# Patient Record
Sex: FEMALE | Race: WHITE | NOT HISPANIC OR LATINO | Employment: OTHER | URBAN - METROPOLITAN AREA
[De-identification: names, ages, dates, MRNs, and addresses within clinical notes are randomized per-mention and may not be internally consistent; named-entity substitution may affect disease eponyms.]

---

## 2020-08-19 ENCOUNTER — TRANSCRIBE ORDERS (OUTPATIENT)
Dept: ADMINISTRATIVE | Facility: HOSPITAL | Age: 65
End: 2020-08-19

## 2020-08-19 DIAGNOSIS — M25.572 LEFT ANKLE PAIN, UNSPECIFIED CHRONICITY: Primary | ICD-10-CM

## 2020-08-25 ENCOUNTER — HOSPITAL ENCOUNTER (OUTPATIENT)
Dept: CT IMAGING | Facility: HOSPITAL | Age: 65
Discharge: HOME | End: 2020-08-25
Admitting: ORTHOPAEDIC SURGERY

## 2020-08-25 DIAGNOSIS — M25.572 LEFT ANKLE PAIN, UNSPECIFIED CHRONICITY: ICD-10-CM

## 2020-08-25 PROCEDURE — 73700 CT LOWER EXTREMITY W/O DYE: CPT

## 2020-09-30 ENCOUNTER — APPOINTMENT (OUTPATIENT)
Dept: PREADMISSION TESTING | Facility: HOSPITAL | Age: 65
End: 2020-09-30

## 2020-09-30 VITALS
SYSTOLIC BLOOD PRESSURE: 205 MMHG | DIASTOLIC BLOOD PRESSURE: 97 MMHG | BODY MASS INDEX: 26.63 KG/M2 | RESPIRATION RATE: 18 BRPM | WEIGHT: 186 LBS | HEART RATE: 91 BPM | TEMPERATURE: 97.7 F | OXYGEN SATURATION: 99 % | HEIGHT: 70 IN

## 2020-09-30 LAB
ALBUMIN SERPL-MCNC: 4.2 G/DL (ref 3.5–5.2)
ALBUMIN/GLOB SERPL: 1.2 G/DL
ALP SERPL-CCNC: 86 U/L (ref 39–117)
ALT SERPL W P-5'-P-CCNC: 17 U/L (ref 1–33)
ANION GAP SERPL CALCULATED.3IONS-SCNC: 12 MMOL/L (ref 5–15)
AST SERPL-CCNC: 16 U/L (ref 1–32)
BASOPHILS # BLD AUTO: 0.06 10*3/MM3 (ref 0–0.2)
BASOPHILS NFR BLD AUTO: 0.9 % (ref 0–1.5)
BILIRUB SERPL-MCNC: 0.5 MG/DL (ref 0–1.2)
BUN SERPL-MCNC: 18 MG/DL (ref 8–23)
BUN/CREAT SERPL: 23.1 (ref 7–25)
CALCIUM SPEC-SCNC: 10 MG/DL (ref 8.6–10.5)
CHLORIDE SERPL-SCNC: 106 MMOL/L (ref 98–107)
CO2 SERPL-SCNC: 23 MMOL/L (ref 22–29)
CREAT SERPL-MCNC: 0.78 MG/DL (ref 0.57–1)
DEPRECATED RDW RBC AUTO: 39.2 FL (ref 37–54)
EOSINOPHIL # BLD AUTO: 0.09 10*3/MM3 (ref 0–0.4)
EOSINOPHIL NFR BLD AUTO: 1.3 % (ref 0.3–6.2)
ERYTHROCYTE [DISTWIDTH] IN BLOOD BY AUTOMATED COUNT: 13.6 % (ref 12.3–15.4)
GFR SERPL CREATININE-BSD FRML MDRD: 74 ML/MIN/1.73
GLOBULIN UR ELPH-MCNC: 3.4 GM/DL
GLUCOSE SERPL-MCNC: 109 MG/DL (ref 65–99)
HCT VFR BLD AUTO: 43.6 % (ref 34–46.6)
HGB BLD-MCNC: 15.1 G/DL (ref 12–15.9)
IMM GRANULOCYTES # BLD AUTO: 0.03 10*3/MM3 (ref 0–0.05)
IMM GRANULOCYTES NFR BLD AUTO: 0.4 % (ref 0–0.5)
LYMPHOCYTES # BLD AUTO: 1.83 10*3/MM3 (ref 0.7–3.1)
LYMPHOCYTES NFR BLD AUTO: 26.7 % (ref 19.6–45.3)
MCH RBC QN AUTO: 28.2 PG (ref 26.6–33)
MCHC RBC AUTO-ENTMCNC: 34.6 G/DL (ref 31.5–35.7)
MCV RBC AUTO: 81.5 FL (ref 79–97)
MONOCYTES # BLD AUTO: 0.49 10*3/MM3 (ref 0.1–0.9)
MONOCYTES NFR BLD AUTO: 7.2 % (ref 5–12)
NEUTROPHILS NFR BLD AUTO: 4.35 10*3/MM3 (ref 1.7–7)
NEUTROPHILS NFR BLD AUTO: 63.5 % (ref 42.7–76)
NRBC BLD AUTO-RTO: 0 /100 WBC (ref 0–0.2)
PLATELET # BLD AUTO: 249 10*3/MM3 (ref 140–450)
PMV BLD AUTO: 9.9 FL (ref 6–12)
POTASSIUM SERPL-SCNC: 4.1 MMOL/L (ref 3.5–5.2)
PROT SERPL-MCNC: 7.6 G/DL (ref 6–8.5)
RBC # BLD AUTO: 5.35 10*6/MM3 (ref 3.77–5.28)
SODIUM SERPL-SCNC: 141 MMOL/L (ref 136–145)
WBC # BLD AUTO: 6.85 10*3/MM3 (ref 3.4–10.8)

## 2020-09-30 PROCEDURE — C9803 HOPD COVID-19 SPEC COLLECT: HCPCS

## 2020-09-30 PROCEDURE — 85025 COMPLETE CBC W/AUTO DIFF WBC: CPT | Performed by: ORTHOPAEDIC SURGERY

## 2020-09-30 PROCEDURE — 80053 COMPREHEN METABOLIC PANEL: CPT | Performed by: ORTHOPAEDIC SURGERY

## 2020-09-30 PROCEDURE — 93010 ELECTROCARDIOGRAM REPORT: CPT | Performed by: INTERNAL MEDICINE

## 2020-09-30 PROCEDURE — 36415 COLL VENOUS BLD VENIPUNCTURE: CPT

## 2020-09-30 PROCEDURE — U0004 COV-19 TEST NON-CDC HGH THRU: HCPCS | Performed by: ORTHOPAEDIC SURGERY

## 2020-09-30 PROCEDURE — 93005 ELECTROCARDIOGRAM TRACING: CPT

## 2020-10-01 ENCOUNTER — PREP FOR SURGERY (OUTPATIENT)
Dept: OTHER | Facility: HOSPITAL | Age: 65
End: 2020-10-01

## 2020-10-01 DIAGNOSIS — Z98.890 S/P FOOT SURGERY: Primary | ICD-10-CM

## 2020-10-01 LAB — SARS-COV-2 RNA RESP QL NAA+PROBE: DETECTED

## 2020-10-01 RX ORDER — CEFAZOLIN SODIUM 2 G/100ML
2 INJECTION, SOLUTION INTRAVENOUS ONCE
Status: CANCELLED | OUTPATIENT
Start: 2020-10-02 | End: 2020-10-01

## 2020-11-27 ENCOUNTER — TRANSCRIBE ORDERS (OUTPATIENT)
Dept: PREADMISSION TESTING | Facility: HOSPITAL | Age: 65
End: 2020-11-27

## 2020-11-27 DIAGNOSIS — Z01.818 OTHER SPECIFIED PRE-OPERATIVE EXAMINATION: Primary | ICD-10-CM

## 2020-12-01 ENCOUNTER — APPOINTMENT (OUTPATIENT)
Dept: PREADMISSION TESTING | Facility: HOSPITAL | Age: 65
End: 2020-12-01

## 2020-12-01 VITALS
HEART RATE: 84 BPM | SYSTOLIC BLOOD PRESSURE: 161 MMHG | OXYGEN SATURATION: 98 % | RESPIRATION RATE: 20 BRPM | HEIGHT: 70 IN | WEIGHT: 187.2 LBS | BODY MASS INDEX: 26.8 KG/M2 | TEMPERATURE: 97 F | DIASTOLIC BLOOD PRESSURE: 85 MMHG

## 2020-12-01 LAB
ALBUMIN SERPL-MCNC: 4.9 G/DL (ref 3.5–5.2)
ALBUMIN/GLOB SERPL: 1.8 G/DL
ALP SERPL-CCNC: 85 U/L (ref 39–117)
ALT SERPL W P-5'-P-CCNC: 17 U/L (ref 1–33)
ANION GAP SERPL CALCULATED.3IONS-SCNC: 11.3 MMOL/L (ref 5–15)
AST SERPL-CCNC: 22 U/L (ref 1–32)
BILIRUB SERPL-MCNC: 0.3 MG/DL (ref 0–1.2)
BUN SERPL-MCNC: 21 MG/DL (ref 8–23)
BUN/CREAT SERPL: 29.2 (ref 7–25)
CALCIUM SPEC-SCNC: 9.6 MG/DL (ref 8.6–10.5)
CHLORIDE SERPL-SCNC: 105 MMOL/L (ref 98–107)
CO2 SERPL-SCNC: 22.7 MMOL/L (ref 22–29)
CREAT SERPL-MCNC: 0.72 MG/DL (ref 0.57–1)
DEPRECATED RDW RBC AUTO: 40.3 FL (ref 37–54)
ERYTHROCYTE [DISTWIDTH] IN BLOOD BY AUTOMATED COUNT: 13.1 % (ref 12.3–15.4)
GFR SERPL CREATININE-BSD FRML MDRD: 81 ML/MIN/1.73
GLOBULIN UR ELPH-MCNC: 2.7 GM/DL
GLUCOSE SERPL-MCNC: 93 MG/DL (ref 65–99)
HCT VFR BLD AUTO: 43.6 % (ref 34–46.6)
HGB BLD-MCNC: 14.8 G/DL (ref 12–15.9)
MCH RBC QN AUTO: 29.4 PG (ref 26.6–33)
MCHC RBC AUTO-ENTMCNC: 33.9 G/DL (ref 31.5–35.7)
MCV RBC AUTO: 86.5 FL (ref 79–97)
PLATELET # BLD AUTO: 239 10*3/MM3 (ref 140–450)
PMV BLD AUTO: 10.2 FL (ref 6–12)
POTASSIUM SERPL-SCNC: 4.1 MMOL/L (ref 3.5–5.2)
PROT SERPL-MCNC: 7.6 G/DL (ref 6–8.5)
RBC # BLD AUTO: 5.04 10*6/MM3 (ref 3.77–5.28)
SODIUM SERPL-SCNC: 139 MMOL/L (ref 136–145)
WBC # BLD AUTO: 9.29 10*3/MM3 (ref 3.4–10.8)

## 2020-12-01 PROCEDURE — 36415 COLL VENOUS BLD VENIPUNCTURE: CPT

## 2020-12-01 PROCEDURE — 80053 COMPREHEN METABOLIC PANEL: CPT

## 2020-12-01 PROCEDURE — 85027 COMPLETE CBC AUTOMATED: CPT

## 2020-12-01 RX ORDER — MULTIVIT WITH MINERALS/LUTEIN
1000 TABLET ORAL DAILY
COMMUNITY

## 2020-12-01 RX ORDER — LISINOPRIL AND HYDROCHLOROTHIAZIDE 12.5; 1 MG/1; MG/1
1 TABLET ORAL DAILY
COMMUNITY

## 2020-12-01 NOTE — DISCHARGE INSTRUCTIONS
Take the following medications the morning of surgery:  NONE    ARRIVAL TIME 0545      If you are on prescription narcotic pain medication to control your pain you may also take that medication the morning of surgery.    General Instructions:  • Do not eat solid food after midnight the night before surgery.  • You may drink clear liquids day of surgery but must stop at least one hour before your hospital arrival time - CUTOFF TIME 0445  • It is beneficial for you to have a clear drink that contains carbohydrates the day of surgery.  We suggest a 12 to 20 ounce bottle of Gatorade or Powerade for non-diabetic patients or a 12 to 20 ounce bottle of G2 or Powerade Zero for diabetic patients. (Pediatric patients, are not advised to drink a 12 to 20 ounce carbohydrate drink)    Clear liquids are liquids you can see through.  Nothing red in color.     Plain water                               Sports drinks  Sodas                                   Gelatin (Jell-O)  Fruit juices without pulp such as white grape juice and apple juice  Popsicles that contain no fruit or yogurt  Tea or coffee (no cream or milk added)  Gatorade / Powerade  G2 / Powerade Zero    • Infants may have breast milk up to four hours before surgery.  • Infants drinking formula may drink formula up to six hours before surgery.   • Patients who avoid smoking, chewing tobacco and alcohol for 4 weeks prior to surgery have a reduced risk of post-operative complications.  Quit smoking as many days before surgery as you can.  • Do not smoke, use chewing tobacco or drink alcohol the day of surgery.   • If applicable bring your C-PAP/ BI-PAP machine.  • Bring any papers given to you in the doctor’s office.  • Wear clean comfortable clothes.  • Do not wear contact lenses, false eyelashes or make-up.  Bring a case for your glasses.   • Bring crutches or walker if applicable.  • Remove all piercings.  Leave jewelry and any other valuables at home.  • Hair extensions  with metal clips must be removed prior to surgery.  • The Pre-Admission Testing nurse will instruct you to bring medications if unable to obtain an accurate list in Pre-Admission Testing.            Preventing a Surgical Site Infection:  • For 2 to 3 days before surgery, avoid shaving with a razor because the razor can irritate skin and make it easier to develop an infection.    • Any areas of open skin can increase the risk of a post-operative wound infection by allowing bacteria to enter and travel throughout the body.  Notify your surgeon if you have any skin wounds / rashes even if it is not near the expected surgical site.  The area will need assessed to determine if surgery should be delayed until it is healed.  • The night prior to surgery shower using a fresh bar of anti-bacterial soap (such as Dial) and clean washcloth.  Sleep in a clean bed with clean clothing.  Do not allow pets to sleep with you.  • Shower on the morning of surgery using a fresh bar of anti-bacterial soap (such as Dial) and clean washcloth.  Dry with a clean towel and dress in clean clothing.  • Ask your surgeon if you will be receiving antibiotics prior to surgery.  • Make sure you, your family, and all healthcare providers clean their hands with soap and water or an alcohol based hand  before caring for you or your wound.    Day of surgery:  Your arrival time is approximately two hours before your scheduled surgery time.  Upon arrival, a Pre-op nurse and Anesthesiologist will review your health history, obtain vital signs, and answer questions you may have.  The only belongings needed at this time will be a list of your home medications and if applicable your C-PAP/BI-PAP machine.  A Pre-op nurse will start an IV and you may receive medication in preparation for surgery, including something to help you relax.     Please be aware that surgery does come with discomfort.  We want to make every effort to control your discomfort so  please discuss any uncontrolled symptoms with your nurse.   Your doctor will most likely have prescribed pain medications.      If you are going home after surgery you will receive individualized written care instructions before being discharged.  A responsible adult must drive you to and from the hospital on the day of your surgery and stay with you for 24 hours.    If you are staying overnight following surgery, you will be transported to your hospital room following the recovery period.  [facility] has all private rooms.    If you have any questions please call Pre-Admission Testing at [phone].  Deductibles and co-payments are collected on the day of service. Please be prepared to pay the required co-pay, deductible or deposit on the day of service as defined by your plan.    Patient Education for Self-Quarantine Process    Following your COVID testing, we strongly recommend that you do not leave your home after you have been tested for COVID except to get medical care. This includes not going to work, school or to public areas.  If this is not possible for you to do please limit your activities to only required outings.  Be sure to wear a mask when you are with other people, practice social distancing and wash your hands frequently.      The following items provide additional details to keep you safe.  • Wash your hands with soap and water frequently for at least 20 seconds.   • Avoid touching your eyes, nose and mouth with unwashed hands.  • Do not share anything - utensils, towels, food from the same bowl.   • Have your own utensils, drinking glass, dishes, towels and bedding.   • Do not have visitors.   • Do use FaceTime to stay in touch with family and friends.  • You should stay in a specific room away from others if possible.   • Stay at least 6 feet away from others in the home if you cannot have a dedicated room to yourself.   • Do not snuggle with your pet. While the CDC says there  is no evidence that pets can spread COVID-19 or be infected from humans, it is probably best to avoid “petting, snuggling, being kissed or licked and sharing food (during self-quarantine)”, according to the CDC.   • Sanitize household surfaces daily. Include all high touch areas (door handles, light switches, phones, countertops, etc.)  • Do not share a bathroom with others, if possible.   • Wear a mask around others in your home if you are unable to stay in a separate room or 6 feet apart. If  you are unable to wear a mask, have your family member wear a mask if they must be within 6 feet of you.   Call your surgeon immediately if you experience any of the following symptoms:  • Sore Throat  • Shortness of Breath or difficulty breathing  • Cough  • Chills  • Body soreness or muscle pain  • Headache  • Fever  • New loss of taste or smell  • Do not arrive for your surgery ill.  Your procedure will need to be rescheduled to another time.  You will need to call your physician before the day of surgery to avoid any unnecessary exposure to hospital staff as well as other patients.

## 2020-12-09 ENCOUNTER — LAB (OUTPATIENT)
Dept: LAB | Facility: HOSPITAL | Age: 65
End: 2020-12-09

## 2020-12-09 ENCOUNTER — PREP FOR SURGERY (OUTPATIENT)
Dept: OTHER | Facility: HOSPITAL | Age: 65
End: 2020-12-09

## 2020-12-09 DIAGNOSIS — M19.172 POST-TRAUMATIC ARTHRITIS OF LEFT ANKLE: Primary | ICD-10-CM

## 2020-12-09 DIAGNOSIS — Z01.818 OTHER SPECIFIED PRE-OPERATIVE EXAMINATION: ICD-10-CM

## 2020-12-09 PROCEDURE — C9803 HOPD COVID-19 SPEC COLLECT: HCPCS

## 2020-12-09 PROCEDURE — U0004 COV-19 TEST NON-CDC HGH THRU: HCPCS

## 2020-12-09 RX ORDER — CEFAZOLIN SODIUM 2 G/100ML
2 INJECTION, SOLUTION INTRAVENOUS ONCE
Status: CANCELLED | OUTPATIENT
Start: 2020-12-11 | End: 2020-12-09

## 2020-12-10 LAB — SARS-COV-2 RNA RESP QL NAA+PROBE: NOT DETECTED

## 2020-12-11 ENCOUNTER — HOSPITAL ENCOUNTER (INPATIENT)
Facility: HOSPITAL | Age: 65
LOS: 1 days | Discharge: HOME | End: 2020-12-12
Attending: ORTHOPAEDIC SURGERY | Admitting: ORTHOPAEDIC SURGERY

## 2020-12-11 ENCOUNTER — ANESTHESIA (OUTPATIENT)
Dept: PERIOP | Facility: HOSPITAL | Age: 65
End: 2020-12-11

## 2020-12-11 ENCOUNTER — ANESTHESIA EVENT (OUTPATIENT)
Dept: PERIOP | Facility: HOSPITAL | Age: 65
End: 2020-12-11

## 2020-12-11 ENCOUNTER — APPOINTMENT (OUTPATIENT)
Dept: GENERAL RADIOLOGY | Facility: HOSPITAL | Age: 65
End: 2020-12-11

## 2020-12-11 DIAGNOSIS — Z98.890 S/P FOOT SURGERY: ICD-10-CM

## 2020-12-11 DIAGNOSIS — Z96.662 S/P ANKLE JOINT REPLACEMENT, LEFT: Primary | ICD-10-CM

## 2020-12-11 DIAGNOSIS — M19.172 POST-TRAUMATIC ARTHRITIS OF LEFT ANKLE: ICD-10-CM

## 2020-12-11 PROCEDURE — 25010000003 CEFAZOLIN IN DEXTROSE 2-4 GM/100ML-% SOLUTION: Performed by: NURSE PRACTITIONER

## 2020-12-11 PROCEDURE — 25010000002 PROPOFOL 10 MG/ML EMULSION: Performed by: NURSE ANESTHETIST, CERTIFIED REGISTERED

## 2020-12-11 PROCEDURE — 0SPG04Z REMOVAL OF INTERNAL FIXATION DEVICE FROM LEFT ANKLE JOINT, OPEN APPROACH: ICD-10-PCS | Performed by: ORTHOPAEDIC SURGERY

## 2020-12-11 PROCEDURE — 25010000002 FENTANYL CITRATE (PF) 100 MCG/2ML SOLUTION: Performed by: NURSE ANESTHETIST, CERTIFIED REGISTERED

## 2020-12-11 PROCEDURE — 73600 X-RAY EXAM OF ANKLE: CPT

## 2020-12-11 PROCEDURE — 25010000002 VANCOMYCIN 1 G RECONSTITUTED SOLUTION: Performed by: ORTHOPAEDIC SURGERY

## 2020-12-11 PROCEDURE — 25010000002 ONDANSETRON PER 1 MG: Performed by: NURSE ANESTHETIST, CERTIFIED REGISTERED

## 2020-12-11 PROCEDURE — 25010000002 MIDAZOLAM PER 1 MG: Performed by: ANESTHESIOLOGY

## 2020-12-11 PROCEDURE — C1776 JOINT DEVICE (IMPLANTABLE): HCPCS | Performed by: ORTHOPAEDIC SURGERY

## 2020-12-11 PROCEDURE — 0SRG0JA REPLACEMENT OF LEFT ANKLE JOINT WITH SYNTHETIC SUBSTITUTE, UNCEMENTED, OPEN APPROACH: ICD-10-PCS | Performed by: ORTHOPAEDIC SURGERY

## 2020-12-11 PROCEDURE — 76000 FLUOROSCOPY <1 HR PHYS/QHP: CPT

## 2020-12-11 PROCEDURE — 25010000002 KETOROLAC TROMETHAMINE PER 15 MG: Performed by: NURSE ANESTHETIST, CERTIFIED REGISTERED

## 2020-12-11 PROCEDURE — C1713 ANCHOR/SCREW BN/BN,TIS/BN: HCPCS | Performed by: ORTHOPAEDIC SURGERY

## 2020-12-11 PROCEDURE — 25010000002 FENTANYL CITRATE (PF) 100 MCG/2ML SOLUTION: Performed by: ANESTHESIOLOGY

## 2020-12-11 PROCEDURE — 25010000002 DEXAMETHASONE PER 1 MG: Performed by: NURSE ANESTHETIST, CERTIFIED REGISTERED

## 2020-12-11 PROCEDURE — 0L8P0ZZ DIVISION OF LEFT LOWER LEG TENDON, OPEN APPROACH: ICD-10-PCS | Performed by: ORTHOPAEDIC SURGERY

## 2020-12-11 PROCEDURE — 25010000002 HYDROMORPHONE PER 4 MG: Performed by: NURSE ANESTHETIST, CERTIFIED REGISTERED

## 2020-12-11 DEVICE — IMPLANTABLE DEVICE
Type: IMPLANTABLE DEVICE | Site: ANKLE | Status: FUNCTIONAL
Brand: INFINITY™ EVERLAST™

## 2020-12-11 DEVICE — TIBIAL TRAY
Type: IMPLANTABLE DEVICE | Site: ANKLE | Status: FUNCTIONAL
Brand: INFINITY ADAPTIS

## 2020-12-11 DEVICE — IMPLANTABLE DEVICE
Type: IMPLANTABLE DEVICE | Site: ANKLE | Status: FUNCTIONAL
Brand: INFINITY™ BIOFOAM™ 3D

## 2020-12-11 RX ORDER — BUPIVACAINE HYDROCHLORIDE 2.5 MG/ML
INJECTION, SOLUTION EPIDURAL; INFILTRATION; INTRACAUDAL
Status: COMPLETED | OUTPATIENT
Start: 2020-12-11 | End: 2020-12-11

## 2020-12-11 RX ORDER — SODIUM CHLORIDE, SODIUM LACTATE, POTASSIUM CHLORIDE, CALCIUM CHLORIDE 600; 310; 30; 20 MG/100ML; MG/100ML; MG/100ML; MG/100ML
9 INJECTION, SOLUTION INTRAVENOUS CONTINUOUS
Status: DISCONTINUED | OUTPATIENT
Start: 2020-12-11 | End: 2020-12-11

## 2020-12-11 RX ORDER — ASPIRIN 325 MG
325 TABLET, DELAYED RELEASE (ENTERIC COATED) ORAL DAILY
Status: DISCONTINUED | OUTPATIENT
Start: 2020-12-12 | End: 2020-12-12 | Stop reason: HOSPADM

## 2020-12-11 RX ORDER — SODIUM CHLORIDE 0.9 % (FLUSH) 0.9 %
3-10 SYRINGE (ML) INJECTION AS NEEDED
Status: DISCONTINUED | OUTPATIENT
Start: 2020-12-11 | End: 2020-12-11 | Stop reason: HOSPADM

## 2020-12-11 RX ORDER — LIDOCAINE HYDROCHLORIDE 10 MG/ML
0.5 INJECTION, SOLUTION EPIDURAL; INFILTRATION; INTRACAUDAL; PERINEURAL ONCE AS NEEDED
Status: DISCONTINUED | OUTPATIENT
Start: 2020-12-11 | End: 2020-12-11 | Stop reason: HOSPADM

## 2020-12-11 RX ORDER — LIDOCAINE HYDROCHLORIDE 20 MG/ML
INJECTION, SOLUTION INFILTRATION; PERINEURAL AS NEEDED
Status: DISCONTINUED | OUTPATIENT
Start: 2020-12-11 | End: 2020-12-11 | Stop reason: SURG

## 2020-12-11 RX ORDER — FLUMAZENIL 0.1 MG/ML
0.2 INJECTION INTRAVENOUS AS NEEDED
Status: DISCONTINUED | OUTPATIENT
Start: 2020-12-11 | End: 2020-12-11 | Stop reason: HOSPADM

## 2020-12-11 RX ORDER — FENTANYL CITRATE 50 UG/ML
50 INJECTION, SOLUTION INTRAMUSCULAR; INTRAVENOUS
Status: DISCONTINUED | OUTPATIENT
Start: 2020-12-11 | End: 2020-12-11 | Stop reason: HOSPADM

## 2020-12-11 RX ORDER — ONDANSETRON 4 MG/1
4 TABLET, FILM COATED ORAL EVERY 6 HOURS PRN
Status: DISCONTINUED | OUTPATIENT
Start: 2020-12-11 | End: 2020-12-12 | Stop reason: HOSPADM

## 2020-12-11 RX ORDER — DIPHENHYDRAMINE HCL 25 MG
25 CAPSULE ORAL
Status: DISCONTINUED | OUTPATIENT
Start: 2020-12-11 | End: 2020-12-11 | Stop reason: HOSPADM

## 2020-12-11 RX ORDER — EPHEDRINE SULFATE 50 MG/ML
5 INJECTION, SOLUTION INTRAVENOUS ONCE AS NEEDED
Status: DISCONTINUED | OUTPATIENT
Start: 2020-12-11 | End: 2020-12-11 | Stop reason: HOSPADM

## 2020-12-11 RX ORDER — PROMETHAZINE HYDROCHLORIDE 25 MG/1
25 SUPPOSITORY RECTAL ONCE AS NEEDED
Status: DISCONTINUED | OUTPATIENT
Start: 2020-12-11 | End: 2020-12-11 | Stop reason: HOSPADM

## 2020-12-11 RX ORDER — OXYCODONE AND ACETAMINOPHEN 7.5; 325 MG/1; MG/1
1 TABLET ORAL ONCE AS NEEDED
Status: DISCONTINUED | OUTPATIENT
Start: 2020-12-11 | End: 2020-12-11 | Stop reason: HOSPADM

## 2020-12-11 RX ORDER — NALOXONE HCL 0.4 MG/ML
0.4 VIAL (ML) INJECTION
Status: DISCONTINUED | OUTPATIENT
Start: 2020-12-11 | End: 2020-12-12 | Stop reason: HOSPADM

## 2020-12-11 RX ORDER — ACETAMINOPHEN 325 MG/1
325 TABLET ORAL EVERY 4 HOURS PRN
Status: DISCONTINUED | OUTPATIENT
Start: 2020-12-11 | End: 2020-12-12 | Stop reason: HOSPADM

## 2020-12-11 RX ORDER — ONDANSETRON 2 MG/ML
4 INJECTION INTRAMUSCULAR; INTRAVENOUS ONCE AS NEEDED
Status: DISCONTINUED | OUTPATIENT
Start: 2020-12-11 | End: 2020-12-11 | Stop reason: HOSPADM

## 2020-12-11 RX ORDER — CEFAZOLIN SODIUM 2 G/100ML
2 INJECTION, SOLUTION INTRAVENOUS EVERY 8 HOURS
Status: DISCONTINUED | OUTPATIENT
Start: 2020-12-11 | End: 2020-12-11

## 2020-12-11 RX ORDER — OXYCODONE AND ACETAMINOPHEN 7.5; 325 MG/1; MG/1
2 TABLET ORAL EVERY 4 HOURS PRN
Status: DISCONTINUED | OUTPATIENT
Start: 2020-12-11 | End: 2020-12-12 | Stop reason: HOSPADM

## 2020-12-11 RX ORDER — CEFAZOLIN SODIUM 2 G/100ML
2 INJECTION, SOLUTION INTRAVENOUS EVERY 8 HOURS
Status: COMPLETED | OUTPATIENT
Start: 2020-12-11 | End: 2020-12-12

## 2020-12-11 RX ORDER — PROPOFOL 10 MG/ML
VIAL (ML) INTRAVENOUS AS NEEDED
Status: DISCONTINUED | OUTPATIENT
Start: 2020-12-11 | End: 2020-12-11 | Stop reason: SURG

## 2020-12-11 RX ORDER — FAMOTIDINE 10 MG/ML
20 INJECTION, SOLUTION INTRAVENOUS ONCE
Status: COMPLETED | OUTPATIENT
Start: 2020-12-11 | End: 2020-12-11

## 2020-12-11 RX ORDER — MULTIPLE VITAMINS W/ MINERALS TAB 9MG-400MCG
1 TAB ORAL DAILY
COMMUNITY

## 2020-12-11 RX ORDER — CEFAZOLIN SODIUM 2 G/100ML
2 INJECTION, SOLUTION INTRAVENOUS ONCE
Status: DISCONTINUED | OUTPATIENT
Start: 2020-12-11 | End: 2020-12-11

## 2020-12-11 RX ORDER — HYDROMORPHONE HYDROCHLORIDE 1 MG/ML
0.5 INJECTION, SOLUTION INTRAMUSCULAR; INTRAVENOUS; SUBCUTANEOUS
Status: DISCONTINUED | OUTPATIENT
Start: 2020-12-11 | End: 2020-12-12 | Stop reason: HOSPADM

## 2020-12-11 RX ORDER — OXYCODONE AND ACETAMINOPHEN 7.5; 325 MG/1; MG/1
1 TABLET ORAL EVERY 4 HOURS PRN
Status: DISCONTINUED | OUTPATIENT
Start: 2020-12-11 | End: 2020-12-12 | Stop reason: HOSPADM

## 2020-12-11 RX ORDER — HYDROCODONE BITARTRATE AND ACETAMINOPHEN 7.5; 325 MG/1; MG/1
1 TABLET ORAL ONCE AS NEEDED
Status: DISCONTINUED | OUTPATIENT
Start: 2020-12-11 | End: 2020-12-11 | Stop reason: HOSPADM

## 2020-12-11 RX ORDER — DIPHENHYDRAMINE HYDROCHLORIDE 50 MG/ML
12.5 INJECTION INTRAMUSCULAR; INTRAVENOUS
Status: DISCONTINUED | OUTPATIENT
Start: 2020-12-11 | End: 2020-12-11 | Stop reason: HOSPADM

## 2020-12-11 RX ORDER — PROMETHAZINE HYDROCHLORIDE 25 MG/1
25 TABLET ORAL ONCE AS NEEDED
Status: DISCONTINUED | OUTPATIENT
Start: 2020-12-11 | End: 2020-12-11 | Stop reason: HOSPADM

## 2020-12-11 RX ORDER — ONDANSETRON 2 MG/ML
4 INJECTION INTRAMUSCULAR; INTRAVENOUS EVERY 6 HOURS PRN
Status: DISCONTINUED | OUTPATIENT
Start: 2020-12-11 | End: 2020-12-12 | Stop reason: HOSPADM

## 2020-12-11 RX ORDER — MELATONIN
5000 DAILY
Status: DISCONTINUED | OUTPATIENT
Start: 2020-12-11 | End: 2020-12-12 | Stop reason: HOSPADM

## 2020-12-11 RX ORDER — DIPHENHYDRAMINE HCL 25 MG
25 CAPSULE ORAL EVERY 6 HOURS PRN
Status: DISCONTINUED | OUTPATIENT
Start: 2020-12-11 | End: 2020-12-12 | Stop reason: HOSPADM

## 2020-12-11 RX ORDER — LABETALOL HYDROCHLORIDE 5 MG/ML
5 INJECTION, SOLUTION INTRAVENOUS
Status: DISCONTINUED | OUTPATIENT
Start: 2020-12-11 | End: 2020-12-11 | Stop reason: HOSPADM

## 2020-12-11 RX ORDER — HYDROMORPHONE HYDROCHLORIDE 1 MG/ML
0.5 INJECTION, SOLUTION INTRAMUSCULAR; INTRAVENOUS; SUBCUTANEOUS
Status: DISCONTINUED | OUTPATIENT
Start: 2020-12-11 | End: 2020-12-11 | Stop reason: HOSPADM

## 2020-12-11 RX ORDER — SODIUM CHLORIDE 0.9 % (FLUSH) 0.9 %
3 SYRINGE (ML) INJECTION EVERY 12 HOURS SCHEDULED
Status: DISCONTINUED | OUTPATIENT
Start: 2020-12-11 | End: 2020-12-11 | Stop reason: HOSPADM

## 2020-12-11 RX ORDER — DEXAMETHASONE SODIUM PHOSPHATE 10 MG/ML
INJECTION INTRAMUSCULAR; INTRAVENOUS AS NEEDED
Status: DISCONTINUED | OUTPATIENT
Start: 2020-12-11 | End: 2020-12-11 | Stop reason: SURG

## 2020-12-11 RX ORDER — KETOROLAC TROMETHAMINE 30 MG/ML
INJECTION, SOLUTION INTRAMUSCULAR; INTRAVENOUS AS NEEDED
Status: DISCONTINUED | OUTPATIENT
Start: 2020-12-11 | End: 2020-12-11 | Stop reason: SURG

## 2020-12-11 RX ORDER — MIDAZOLAM HYDROCHLORIDE 1 MG/ML
1 INJECTION INTRAMUSCULAR; INTRAVENOUS
Status: DISCONTINUED | OUTPATIENT
Start: 2020-12-11 | End: 2020-12-11 | Stop reason: HOSPADM

## 2020-12-11 RX ORDER — MAGNESIUM HYDROXIDE 1200 MG/15ML
LIQUID ORAL AS NEEDED
Status: DISCONTINUED | OUTPATIENT
Start: 2020-12-11 | End: 2020-12-11 | Stop reason: HOSPADM

## 2020-12-11 RX ORDER — NALOXONE HCL 0.4 MG/ML
0.2 VIAL (ML) INJECTION AS NEEDED
Status: DISCONTINUED | OUTPATIENT
Start: 2020-12-11 | End: 2020-12-11 | Stop reason: HOSPADM

## 2020-12-11 RX ORDER — ONDANSETRON 2 MG/ML
INJECTION INTRAMUSCULAR; INTRAVENOUS AS NEEDED
Status: DISCONTINUED | OUTPATIENT
Start: 2020-12-11 | End: 2020-12-11 | Stop reason: SURG

## 2020-12-11 RX ORDER — VANCOMYCIN HYDROCHLORIDE 1 G/20ML
INJECTION, POWDER, LYOPHILIZED, FOR SOLUTION INTRAVENOUS AS NEEDED
Status: DISCONTINUED | OUTPATIENT
Start: 2020-12-11 | End: 2020-12-11 | Stop reason: HOSPADM

## 2020-12-11 RX ORDER — MULTIPLE VITAMINS W/ MINERALS TAB 9MG-400MCG
1 TAB ORAL DAILY
Status: DISCONTINUED | OUTPATIENT
Start: 2020-12-11 | End: 2020-12-12 | Stop reason: HOSPADM

## 2020-12-11 RX ORDER — CEFAZOLIN SODIUM 2 G/100ML
2 INJECTION, SOLUTION INTRAVENOUS ONCE
Status: COMPLETED | OUTPATIENT
Start: 2020-12-11 | End: 2020-12-11

## 2020-12-11 RX ADMIN — FAMOTIDINE 20 MG: 10 INJECTION INTRAVENOUS at 06:45

## 2020-12-11 RX ADMIN — LISINOPRIL: 10 TABLET ORAL at 12:58

## 2020-12-11 RX ADMIN — LIDOCAINE HYDROCHLORIDE 100 MG: 20 INJECTION, SOLUTION INFILTRATION; PERINEURAL at 06:57

## 2020-12-11 RX ADMIN — BUPIVACAINE HYDROCHLORIDE 30 ML: 2.5 INJECTION, SOLUTION EPIDURAL; INFILTRATION; INTRACAUDAL; PERINEURAL at 06:47

## 2020-12-11 RX ADMIN — FENTANYL CITRATE 50 MCG: 50 INJECTION, SOLUTION INTRAMUSCULAR; INTRAVENOUS at 10:20

## 2020-12-11 RX ADMIN — OXYCODONE HYDROCHLORIDE AND ACETAMINOPHEN 2 TABLET: 7.5; 325 TABLET ORAL at 16:57

## 2020-12-11 RX ADMIN — ONDANSETRON HYDROCHLORIDE 4 MG: 2 SOLUTION INTRAMUSCULAR; INTRAVENOUS at 09:02

## 2020-12-11 RX ADMIN — HYDROMORPHONE HYDROCHLORIDE 0.5 MG: 1 INJECTION, SOLUTION INTRAMUSCULAR; INTRAVENOUS; SUBCUTANEOUS at 10:00

## 2020-12-11 RX ADMIN — CEFAZOLIN SODIUM 2 G: 2 INJECTION, SOLUTION INTRAVENOUS at 20:16

## 2020-12-11 RX ADMIN — BUPIVACAINE HYDROCHLORIDE 30 ML: 2.5 INJECTION, SOLUTION EPIDURAL; INFILTRATION; INTRACAUDAL; PERINEURAL at 06:48

## 2020-12-11 RX ADMIN — CEFAZOLIN SODIUM 2 G: 2 INJECTION, SOLUTION INTRAVENOUS at 07:02

## 2020-12-11 RX ADMIN — OXYCODONE HYDROCHLORIDE AND ACETAMINOPHEN 2 TABLET: 7.5; 325 TABLET ORAL at 21:15

## 2020-12-11 RX ADMIN — SODIUM CHLORIDE, POTASSIUM CHLORIDE, SODIUM LACTATE AND CALCIUM CHLORIDE: 600; 310; 30; 20 INJECTION, SOLUTION INTRAVENOUS at 08:35

## 2020-12-11 RX ADMIN — SODIUM CHLORIDE, POTASSIUM CHLORIDE, SODIUM LACTATE AND CALCIUM CHLORIDE 9 ML/HR: 600; 310; 30; 20 INJECTION, SOLUTION INTRAVENOUS at 06:32

## 2020-12-11 RX ADMIN — Medication 5000 UNITS: at 12:58

## 2020-12-11 RX ADMIN — PROPOFOL 200 MG: 10 INJECTION, EMULSION INTRAVENOUS at 06:57

## 2020-12-11 RX ADMIN — MIDAZOLAM 1 MG: 1 INJECTION INTRAMUSCULAR; INTRAVENOUS at 06:34

## 2020-12-11 RX ADMIN — OXYCODONE HYDROCHLORIDE AND ACETAMINOPHEN 2 TABLET: 7.5; 325 TABLET ORAL at 12:58

## 2020-12-11 RX ADMIN — KETOROLAC TROMETHAMINE 30 MG: 30 INJECTION, SOLUTION INTRAMUSCULAR; INTRAVENOUS at 09:18

## 2020-12-11 RX ADMIN — FENTANYL CITRATE 50 MCG: 50 INJECTION, SOLUTION INTRAMUSCULAR; INTRAVENOUS at 09:56

## 2020-12-11 RX ADMIN — FENTANYL CITRATE 50 MCG: 50 INJECTION, SOLUTION INTRAMUSCULAR; INTRAVENOUS at 06:34

## 2020-12-11 RX ADMIN — DEXAMETHASONE SODIUM PHOSPHATE 8 MG: 10 INJECTION INTRAMUSCULAR; INTRAVENOUS at 07:14

## 2020-12-11 RX ADMIN — MULTIPLE VITAMINS W/ MINERALS TAB 1 TABLET: TAB at 12:58

## 2020-12-11 NOTE — ANESTHESIA PROCEDURE NOTES
Peripheral Block      Patient reassessed immediately prior to procedure    Patient location during procedure: holding area  Start time: 12/11/2020 6:38 AM  Stop time: 12/11/2020 6:44 AM  Reason for block: at surgeon's request and post-op pain management  Performed by  Anesthesiologist: Gisell Nichole MD  Preanesthetic Checklist  Completed: patient identified, site marked, surgical consent, pre-op evaluation, timeout performed, IV checked, risks and benefits discussed and monitors and equipment checked  Prep:  Pt Position: right lateral decubitus  Sterile barriers:gloves, cap and mask  Prep: ChloraPrep  Patient monitoring: blood pressure monitoring, continuous pulse oximetry and EKG  Procedure  Sedation:no    Guidance:ultrasound guided  ULTRASOUND INTERPRETATION. Using ultrasound guidance a 21 G gauge needle was placed in close proximity to the nerve, at which point, under ultrasound guidance anesthetic was injected in the area of the nerve and spread of the anesthesia was seen on ultrasound in close proximity thereto.  There were no abnormalities seen on ultrasound; a digital image was taken; and the patient tolerated the procedure with no complications. Images:still images obtained, printed/placed on chart    Laterality:left  Block Type:popliteal  Injection Technique:single-shot  Needle Type:echogenic  Needle Gauge:21 G  Loss of resistance: Normal     Medications Used: bupivacaine PF (MARCAINE) 0.25 % injection, 30 mL      Post Assessment  Injection Assessment: negative aspiration for heme, no paresthesia on injection and incremental injection  Patient Tolerance:comfortable throughout block  Complications:no

## 2020-12-11 NOTE — PROGRESS NOTES
Continued Stay Note  [facility]     Patient Name: Reinaldo Yen  MRN: [id number]  Today's Date: 12/11/2020    Admit Date: 12/11/2020    Discharge Plan     Row Name 12/11/20 1425       Plan    Plan  Home with family    Plan Comments  Spoke with pt, verified correct information on facesheet and explained the role of CCP. Pt states she has crutches and walker at home and denies the need for RH/HH at this time. Plan will be to d/c home with family support.        Discharge Codes    No documentation.             Ruth Peres RN

## 2020-12-11 NOTE — ANESTHESIA PREPROCEDURE EVALUATION
Anesthesia Evaluation     Patient summary reviewed and Nursing notes reviewed   no history of anesthetic complications:  NPO Solid Status: > 8 hours  NPO Liquid Status: > 2 hours           Airway   Mallampati: II  TM distance: >3 FB  Neck ROM: full  No difficulty expected  Dental      Pulmonary - negative pulmonary ROS and normal exam   (-) COPD, asthma, shortness of breath, sleep apnea, not a smoker  Cardiovascular - normal exam  Exercise tolerance: good (4-7 METS)    (+) hypertension well controlled less than 2 medications,   (-) pacemaker, valvular problems/murmurs, past MI, CAD, dysrhythmias, angina, CHF, KNOX, cardiac stents, CABG, PVD, DVT, hyperlipidemia      Neuro/Psych- negative ROS  (-) seizures, TIA, CVA, weakness, numbness, dementia  GI/Hepatic/Renal/Endo - negative ROS   (-)  obesity, morbid obesity, GERD, liver disease, no renal disease, diabetes, no thyroid disorder    Musculoskeletal     (-) back pain, neck pain, neck stiffness, chronic pain  Abdominal  - normal exam   Substance History - negative use  (-) alcohol use, drug use     OB/GYN negative ob/gyn ROS   (-)  Pregnant        Other   arthritis,      (-) blood dyscrasia, history of cancer, autoimmune disease, chronic steroid use                  Anesthesia Plan    ASA 2     general with block     intravenous induction     Anesthetic plan, all risks, benefits, and alternatives have been provided, discussed and informed consent has been obtained with: patient.

## 2020-12-11 NOTE — H&P
Patient Care Team:  Darian Molina MD as PCP - General (Family Medicine)    Chief complaint left ankle pain    Subjective     History of Present Illness     The patient is a very pleasant 65-year-old female who has had progressive pain and dysfunction related to severe left ankle posttraumatic arthritis.  She presents today for elective left ankle arthroplasty, hardware removal, possible Achilles lengthening.  Please refer to office H&P for full details.    No significant change in history, exam, or plan.    Review of Systems   Review of Systems:  Constitutional: Negative  HENT: Negative  Eyes: Negative  Respiratory: Negative; no shortness of breath  Cardiovascular: Negative; no current chest pain  Gastrointestinal: Negative  : Negative  Skin: Negative except as listed in HPI  Neurological: Negative, no numbness or deficits  Hematological: Negative  Muscoloskeletal: Per HPI                   Past History:  Medical History: has a past medical history of Ankle pain, left, Arthritis, History of 2019 novel coronavirus disease (COVID-19), and Hypertension.   Surgical History: has a past surgical history that includes Total knee arthroplasty (Right); Appendectomy; Cholecystectomy; and ORIF ankle fracture (Left, 1989).   Family History: family history is not on file.   Social History: reports that she has never smoked. She has never used smokeless tobacco. She reports current alcohol use. She reports that she does not use drugs.    Medications Prior to Admission   Medication Sig Dispense Refill Last Dose   • ascorbic acid (VITAMIN C) 1000 MG tablet Take 1,000 mg by mouth Daily.   12/10/2020 at Unknown time   • Cholecalciferol (vitamin D3) 125 MCG (5000 UT) capsule capsule Take 5,000 Units by mouth Daily.   12/10/2020 at Unknown time   • lisinopril-hydrochlorothiazide (PRINZIDE,ZESTORETIC) 10-12.5 MG per tablet Take 1 tablet by mouth Daily.   12/11/2020 at 0800   • Magnesium 100 MG tablet Take 300 mg by mouth  Daily.   12/10/2020 at Unknown time   • multivitamin with minerals (MULTIVITAMIN ADULT PO) Take 1 tablet by mouth Daily.   12/10/2020 at Unknown time      Allergies: Patient has no known allergies.    Objective      Vital Signs       Physical Exam  Physical Exam:  Vital signs reviewed.  Constitutional:  Appears well-developed, well nourished.  HEENT:  Head: normocephalic, atraumatic  Eyes: EOMI, grossly normal.  No scleral icterus.  Neck: Normal range of motion.  Supple, no tracheal deviation.  Cardiovascular: Regular rate.  Pulmonary: Effort normal, symmetric chest expansion, no labored breathing.  Abdominal: Soft, non distended  Neurological: No gross deficits, oriented to person, place, and time.  Skin: Warm and dry  Psychiatric: Normal mood and affect  Musculoskeletal:  Left lower extremity :  Active ankle dorsiflexion and plantarflexion.  Sensation is intact to light touch in sural, saphenous, deep and superficial peroneal, tibial nerve distribution.  Toes are warm and well perfused with brisk capillary refill.               Assessment/Plan   Left ankle posttraumatic arthritis    Assessment & Plan    The patient is a very pleasant 65-year-old female who has had progressive pain and dysfunction related to severe left ankle posttraumatic arthritis.  She presents today for elective left ankle arthroplasty, hardware removal, possible Achilles lengthening.  Please refer to office H&P for full details.    No significant change in history, exam, or plan.    The risks/benefits of surgery, including pain, infection, wound healing problems, need for future procedures, intraoperative fracture, prosthesis failure requiring revision or conversion to arthrodesis, DVT/PE, cardiac event, and/or death were discussed, and the patient elected to proceed with surgery.      Brandon Izaguirre Jr, MD  12/11/20  06:45 EST

## 2020-12-11 NOTE — ANESTHESIA PROCEDURE NOTES
Airway  Urgency: elective    Date/Time: 12/11/2020 6:58 AM  Airway not difficult    General Information and Staff    Patient location during procedure: OR  Anesthesiologist: Gisell Nichole MD  CRNA: Norma River CRNA    Indications and Patient Condition  Indications for airway management: airway protection    Preoxygenated: yes  MILS not maintained throughout  Mask difficulty assessment: 1 - vent by mask    Final Airway Details  Final airway type: supraglottic airway      Successful airway: classic and LMA  Size 4    Number of attempts at approach: 1  Assessment: lips, teeth, and gum same as pre-op    Additional Comments  Inflated to seal.

## 2020-12-11 NOTE — ANESTHESIA PROCEDURE NOTES
Peripheral Block      Patient location during procedure: pre-op  Start time: 12/11/2020 6:34 AM  Stop time: 12/11/2020 6:38 AM  Reason for block: at surgeon's request and post-op pain management  Performed by  Anesthesiologist: Gisell Nichole MD  Preanesthetic Checklist  Completed: patient identified, site marked, surgical consent, pre-op evaluation, timeout performed, IV checked, risks and benefits discussed and monitors and equipment checked  Prep:  Pt Position: supine  Sterile barriers:cap, gloves and sterile barriers  Prep: ChloraPrep  Patient monitoring: blood pressure monitoring, continuous pulse oximetry and EKG  Procedure  Sedation:no    Guidance:ultrasound guided  Images:still images obtained, printed/placed on chart    Laterality:left  Block Type:adductor canal block  Injection Technique:single-shot  Needle Type:echogenic  Needle Gauge:21 G      Medications Used: bupivacaine PF (MARCAINE) 0.25 % injection, 30 mL      Post Assessment  Patient Tolerance:comfortable throughout block  Complications:no

## 2020-12-11 NOTE — ANESTHESIA POSTPROCEDURE EVALUATION
Patient: Reinaldo Yen    Procedure Summary     Date: 12/11/20 Room / Location:  [facility]  /  [facility]    Anesthesia Start: 0652 Anesthesia Stop: 0932    Procedures:       LT TOTAL ANKLE REPLACEMENT INFINITY ACHILLES LENGTHENING (Left Ankle)      HARDWARE REMOVAL (Left Ankle) Diagnosis:     Surgeon: Brandon Izaguirre Jr., MD Provider: Gisell Nichole MD    Anesthesia Type: general with block ASA Status: 2          Anesthesia Type: general with block    Vitals  Vitals Value Taken Time   /76 12/11/20 1030   Temp 36.4 °C (97.5 °F) 12/11/20 1030   Pulse 72 12/11/20 1030   Resp 16 12/11/20 1030   SpO2 95 % 12/11/20 1033   Vitals shown include unvalidated device data.        Post Anesthesia Care and Evaluation    Patient location during evaluation: bedside  Patient participation: complete - patient participated  Level of consciousness: awake  Pain score: 3  Pain management: adequate  Airway patency: patent  Anesthetic complications: No anesthetic complications  PONV Status: none  Cardiovascular status: acceptable  Respiratory status: acceptable  Hydration status: acceptable    Comments: /76   Pulse 72   Temp 36.4 °C (97.5 °F) (Oral)   Resp 16   SpO2 96%

## 2020-12-11 NOTE — OP NOTE
Procedure Note    Reinaldo Yen  12/11/2020    Pre-op Diagnosis:   1.  Left ankle posttraumatic arthritis  2.  Painful retained hardware, left medial malleolus  3.  Left Achilles equinus contracture       Post-Op Diagnosis Codes:  Same    Procedure:  1.  Left total ankle arthroplasty  2.  Removal of hardware, left medial malleolus, separate incision  3.  Achilles lengthening, separate incision    Surgeon(s):  Brandon Izaguirre Jr., MD    Assistant:  ILIR Gillespie    The services of a first assist were necessary for performing the procedure safely and expeditiously.  Kesha Miranda was present for the entire duration of the case and helped with positioning, prepping and draping, retraction, and closure of the incision.      Anesthesia: General with Block    Estimated Blood Loss: minimal    Specimens:                None      Drains: * No LDAs found *    Complications:   None apparent    Disposition:  Stable to PACU for recovery    Indications for procedure:  The patient is a very pleasant 65-year-old female who has had progressive pain and dysfunction related to severe left ankle posttraumatic arthritis.    The above listed procedures were recommended.  The the risks/benefits of surgery, including pain, infection, wound healing problems, need for future procedures, intraoperative fracture, prosthesis failure requiring revision or conversion to arthrodesis, DVT/PE, cardiac event, and/or death were discussed, and the patient elected to proceed with surgery.    Procedure in detail:    The correct patient was identified in preoperative holding.  All risks and benefits of surgery were again discussed in detail, and the patient agreed to proceed with surgery.  The operative extremity was confirmed and marked by myself.  Operative consent reviewed and confirmed to be signed by the patient and myself.    At this time, the patient was wheeled to the operative theatre and placed supine on the OR table.  ELZBIETA/SCD placed on  nonoperative leg. Anesthesia was induced smoothly by our anesthesia colleagues.   Well padded nonsterile tourniquet placed on the upper thigh. The left lower extremity was prepped and draped in standard sterile fashion.  Appropriate presurgical timeout was performed, confirming correct patient, correct extremity, correct procedure, availability of sterile instruments/implants, and the administration of intravenous antibiotics in the form of Ancef within one hour of skin incision.     After careful assessment of the equinus contracture by Silfverskiold testing, the Achilles  tendon is carefully lengthened in triple hemisection fashion with a #15 blade over three  well-spaced longitudinal incisions over the posterior, distal Achilles (two medial and 1  lateral-based tendon incisions made). Following these hemisection cuts, a gentle, gradual dorsiflexion force is applied to the foot, allowing Achilles lengthening and adequate dorsiflexion to the ankle. The Achilles remained intact.    A short medial incision was made over the distal aspect of the malleolus.  Dissection was carried down bluntly to the medial malleolus, taking care to avoid protecting branches of the saphenous vein and nerve.  The retained partially cannulated screw was encountered.  It was removed with the appropriate screwdriver without difficulty.  The washer was also removed.  This wound was irrigated and closed with 3-0 Vicryl and 3-0 nylon.    A longitudinal incision is made over the anterior ankle with a #15 blade and careful  subcutaneous dissection carried down to the retinacular layer. Any branches of the  superficial peroneal nerve are carefully identified and protected. The tibialis anterior and  EHL tendons are identified. The retinaculum/fascia layer is opened in longitudinal fashion in the TA/EHL interval. Full thickness medial and lateral subperiosteal flaps are elevated, and all soft tissues are meticulously removed off the distal  anterior tibia and dorsal talar neck/dome to assure excellent surface matching between the bone and alignment guides.    Osteophytes are carefully maintained as per the [employer] preop navigation  guide/technique and certain, nonessential loose ossicles/bodies excised, as per our preop plan. The deep anterior neurovascular bundle is carefully protected throughout the case. The [employer] tibial alignment guide was first placed on the provided bone model to gain tactile and visual landmarks for proper fit/placement. Metallic markers are then placed in the alignment guide. The alignment guide is then carefully positioned on the distal tibia in the proper location and secured in position with a 2.4 Steinmann pin.     Another pin is placed in the guide to serve as a coronal alignment guide. Intraoperative fluoroscopy is used to confirm appropriate guide orientation/placement using AP ankle view, which is then rechecked by comparison to the preop guide. The remaining Steinmann pins are placed to secure the guide to the distal tibia. Alignment guide removed and coronal sizing guide and conversion instrument placed and secured. The size, orientation, and planned resections are verified under AP and lateral fluoro views. The proximal medial and lateral corners are drilled bicortically. The coronal sizing guide is removed and resection guide placed. Pins are trimmed flush to the guide. The proximal, medial, and lateral tibial resections are then carefully made with oscillating saw. Guide and distal two pins removed, followed by  removal of the anterior wedge of the tibial resection with osteotome.    The foot is plantarflexed and in similar fashion, the [employer] talar alignment guide is  checked on the provided bone model, then carefully positioned on the dorsal talus in the  proper location and temporarily secured in place with a Steinmann pin, followed by two  Steinmann pins in the anterior guide pin holes. The planned  resection is checked on AP and lateral fluoroscopy, and then confirmed with the preop guide. The initial pin is removed and then the alignment guide removed. The appropriate resection guide is selected and placed on the talus. Two additional gutter pins are used to secure it into proper position and pins cut flush with the guide. The talar resection is made with oscillating saw, guide removed, cut checked, and residual talar and tibial bone removed in standard fashion. Of note, for the tibia, the corner chisel is carefully utilized to confirm completed bone cuts, followed by bone removal screw to assist with tibial resection removal.    Reciprocating saw and rongeur used to remove any excess bone. The joint space is  thoroughly irrigated out. The appropriately-sized tibial tray trial is placed, lamina  spreaders used to assure trial flush with distal tibial resection. The trial is sized/positioned to optimize tibial coverage. AP and lateral fluoro views are used to make these assessments/adjustments. Once in optimal position, the posterior broach is malleted into position and temporarily left in place while the two anterior peg holes are prepared with broach technique.     Once all three holes are prepared, broaches are removed, and trial left in place.    The appropriately-sized talar dome and poly trials are then placed. Sizing, position, and  overall stability are assessed with AP and lateral fluoro views. Manual ankle  dorsiflexion/plantarflexion is used to establish the center of rotation and allow the talar  component to rotate into the anatomic position. The talar dome trial is pinned into place,  and poly and talar dome trials removed. The talar resection guide base is properly  positioned flush on the resected talar surface, temporarily held in place with fixation  screws, and checked directly and under fluoro and found to be in appropriate position.     The posterior talar chamfer resection is then  carefully completed with oscillating saw. The two anterior pins are removed. The anterior talar  guide is placed and holes reamed. The anterior talar finish guide is then placed and anterior chamfer finished smooth with the reamer by sliding it side to side within the guide. The  guide is then placed, now rotated 180 degrees, and reamed, followed by reaming with the finish guide in similar rotated position. The fixation pins and resection guide base are removed, cut surfacesinspected and rongeured smooth as needed.    The appropriately-sized tibial tray trial, talar peg drill guide, and poly insert trial are  properly placed into the ankle, and trial reduction performed to establish optimal  medial/lateral positioning. This is confirmed directly as well as with fluoroscopy. The talar  peg drill guide is pinned and the 4mm anterior peg drill is used for the medial and lateral  pegs. Given the relatively neutral alignment and excellent ankle stability/motion, selective anklecollateral releases were not needed.    All pins removed from the talus and tibia along with the talar peg drill guide and trials. The joint is thoroughly irrigated out of any debris and dried. The final tibial tray component is brought onto the field, [employer] size 4 Long.    The tibial component is placed on the insertor and gently impacted into position after carefully aligning all three pegs using the standard impactors and technique.     The final talar component is brought onto the field, [employer] flat cut size 3. The talar component is placed into the ankle, assuring that the pegs align correctly, and then gently impacted. Direct inspection and AP and lateral fluoroscopy are used to ensure proper component positioning and alignment in their final seated positions.    The proper poly size is selected, based on trialing and assessment of ankle motion and joint stability. Meticulous and careful gutter debridement is  "performed with a narrow  reciprocating saw blade on TPS. All debris removed from the joint after thorough irrigation.    The final poly liner is brought onto the field, size 7mm. The poly is installed on the insertor in standard fashion and fully seated into its final position.    Assessment of the ankle revealed stable componentry in all planes and good ankle ROM  with dorsiflexion to 20 degrees and full plantarflexion. Final AP/mortise/lateral ankle  fluoroscopic views confirmed excellent component placement and ankle alignment without complication.     The foot and ankle were well-balanced.    The joint is then thoroughly irrigated out. The anterior ankle incision was closed in layered fashion with 2-0 Vicryl, 3-0 Vicryl, and Nylon on the skin in interrupted fashion. The skin was cleaned and dried and a sterile dressing applied, followed by a well-padded, short leg splint (posterior slab and stirrup). The tourniquet had been released and brisk capillary refill returned to all toes.    Postoperative Plan:  Weightbearing status: Non-weightbearing in the splint  DVT Prophylaxis: Aspirin 325mg daily;  Patient will be instructed to elevate as much as possible (\"toes above the nose\") and to get up and move around at least once per hour while awake to help minimize risk of blood clots.    Brandon Izaguirre Jr, MD     Date: 12/11/2020  Time: 09:46 EST        "

## 2020-12-11 NOTE — PLAN OF CARE
Goal Outcome Evaluation:  Plan of Care Reviewed With: patient     Outcome Summary: LT Ankle today, arrived to floor 1045, A&O, VSS, RA, NWB, voiding per bsc, educated on bp monitoring, should d/c home tomorrow

## 2020-12-12 VITALS
HEART RATE: 71 BPM | DIASTOLIC BLOOD PRESSURE: 64 MMHG | WEIGHT: 186 LBS | HEIGHT: 70 IN | RESPIRATION RATE: 16 BRPM | OXYGEN SATURATION: 97 % | BODY MASS INDEX: 26.63 KG/M2 | SYSTOLIC BLOOD PRESSURE: 106 MMHG | TEMPERATURE: 97.6 F

## 2020-12-12 PROCEDURE — 25010000003 CEFAZOLIN IN DEXTROSE 2-4 GM/100ML-% SOLUTION: Performed by: NURSE PRACTITIONER

## 2020-12-12 PROCEDURE — 97535 SELF CARE MNGMENT TRAINING: CPT

## 2020-12-12 RX ORDER — OXYCODONE AND ACETAMINOPHEN 7.5; 325 MG/1; MG/1
1 TABLET ORAL EVERY 4 HOURS PRN
Qty: 40 TABLET | Refills: 0 | Status: SHIPPED | OUTPATIENT
Start: 2020-12-12 | End: 2021-12-12

## 2020-12-12 RX ORDER — ONDANSETRON 4 MG/1
4 TABLET, FILM COATED ORAL EVERY 8 HOURS PRN
Qty: 20 TABLET | Refills: 0 | Status: SHIPPED | OUTPATIENT
Start: 2020-12-12 | End: 2021-01-11

## 2020-12-12 RX ADMIN — MULTIPLE VITAMINS W/ MINERALS TAB 1 TABLET: TAB at 07:49

## 2020-12-12 RX ADMIN — OXYCODONE HYDROCHLORIDE AND ACETAMINOPHEN 2 TABLET: 7.5; 325 TABLET ORAL at 01:32

## 2020-12-12 RX ADMIN — CEFAZOLIN SODIUM 2 G: 2 INJECTION, SOLUTION INTRAVENOUS at 03:36

## 2020-12-12 RX ADMIN — OXYCODONE HYDROCHLORIDE AND ACETAMINOPHEN 2 TABLET: 7.5; 325 TABLET ORAL at 06:31

## 2020-12-12 RX ADMIN — ASPIRIN 325 MG: 325 TABLET, COATED ORAL at 07:49

## 2020-12-12 RX ADMIN — OXYCODONE HYDROCHLORIDE AND ACETAMINOPHEN 2 TABLET: 7.5; 325 TABLET ORAL at 10:33

## 2020-12-12 RX ADMIN — CEFAZOLIN SODIUM 2 G: 2 INJECTION, SOLUTION INTRAVENOUS at 10:46

## 2020-12-12 RX ADMIN — Medication 5000 UNITS: at 07:49

## 2020-12-12 RX ADMIN — OXYCODONE HYDROCHLORIDE AND ACETAMINOPHEN 2 TABLET: 7.5; 325 TABLET ORAL at 14:42

## 2020-12-12 NOTE — DISCHARGE SUMMARY
Orthopedic Discharge Summary      Patient: Reinaldo Yen      YOB: 1955    Medical Record Number: [id number]    Attending Physician: Brandon Izaguirre Jr., MD  Consulting Physician(s):   Date of Admission: 12/11/2020  5:56 AM  Date of Discharge: 12/12/20      Patient Active Problem List   Diagnosis   • S/P ankle joint replacement, left   • Post-traumatic arthritis of left ankle     Status Post: LT TOTAL ANKLE REPLACEMENT INFINITY ACHILLES LENGTHENING HARDWARE REMOVAL      No Known Allergies    Current Medications:     Discharge Medications      New Medications      Instructions Start Date   aspirin 325 MG EC tablet   325 mg, Oral, Daily      ondansetron 4 MG tablet  Commonly known as: Zofran   4 mg, Oral, Every 8 Hours PRN      oxyCODONE-acetaminophen 7.5-325 MG per tablet  Commonly known as: Percocet   1 tablet, Oral, Every 4 Hours PRN         Continue These Medications      Instructions Start Date   ascorbic acid 1000 MG tablet  Commonly known as: VITAMIN C   1,000 mg, Oral, Daily      lisinopril-hydrochlorothiazide 10-12.5 MG per tablet  Commonly known as: PRINZIDE,ZESTORETIC   1 tablet, Oral, Daily      Magnesium 100 MG tablet   300 mg, Oral, Daily      multivitamin with minerals tablet tablet   1 tablet, Oral, Daily      vitamin D3 125 MCG (5000 UT) capsule capsule   5,000 Units, Oral, Daily                 Past Medical History:   Diagnosis Date   • Ankle pain, left    • Arthritis    • History of 2019 novel coronavirus disease (COVID-19)     7/2020, 9/30/30   • Hypertension      Past Surgical History:   Procedure Laterality Date   • APPENDECTOMY     • CHOLECYSTECTOMY     • ORIF ANKLE FRACTURE Left 1989   • TOTAL KNEE ARTHROPLASTY Right      Social History     Occupational History   • Not on file   Tobacco Use   • Smoking status: Never Smoker   • Smokeless tobacco: Never Used   Substance and Sexual Activity   • Alcohol use: Yes     Comment: OCCAS   • Drug use: Never   • Sexual activity: Not on  "file      Social History     Social History Narrative   • Not on file     Family History   Problem Relation Age of Onset   • Malig Hyperthermia Neg Hx          Physical Exam: 65 y.o. female  General Appearance:    Alert, cooperative, in no acute distress                      Vitals:    12/11/20 2100 12/12/20 0000 12/12/20 0250 12/12/20 0700   BP:  113/63 95/58 106/64   BP Location:  Right arm Right arm Right arm   Patient Position:  Sitting Lying Lying   Pulse:  71 72 71   Resp:  16 16 16   Temp:  97.3 °F (36.3 °C) 97.5 °F (36.4 °C) 97.6 °F (36.4 °C)   TempSrc:  Skin Skin Skin   SpO2:  96% 96% 97%   Weight: 84.4 kg (186 lb)      Height: 177.8 cm (70\")               On the day of discharge:    PHYSICAL EXAM  Resting in NAD  Splint clean, dry, intact  Toes warm, perfused, brisk capillary refill  Flexes/extends toes   Mildly diminished sensibility over toes 2/2 block  No pain with passive stretch                                                                   Hospital Course:  65 y.o. female admitted to [facility] to services of Brandon Izaguirre Jr., MD with S/P ankle joint replacement, left [Z96.662] on 12/11/2020 and underwent LT TOTAL ANKLE REPLACEMENT INFINITY ACHILLES LENGTHENING HARDWARE REMOVAL  Per Brandon Izaguirre Jr., MD. Antibiotic prophylaxis were per SCIP protocol.  For full details regarding this procedure, please refer to the operative note.    The patient tolerated this procedure well and was transferred to the floor postoperatively.  Pain was initially controlled with oral pain medication with IV for breakthrough. Aspirin 325mg daily was initiated for DVT propylaxis.  The patient underwent mobilization therapy with physical therapy remaining NWB on the operative extremity.  Opioids were titrated to achieve appropriate pain management to allow for participation in mobilization exercises. Vital signs are now stable. The splint remains intact without signs or symptoms of infection. Operative extremity " "neurovascular status remains intact. No transfusions of blood products were necessary postoperatively.    Appropriate education re: incision care, activity levels, medications, and follow up visits was completed and all questions were answered.  The patient was tolerating a regular diet, pain was controlled with oral pain medication, and cleared by physical therapy.    The patient is now deemed stable for discharge to home.      DIAGNOSTIC TESTS:     No visits with results within 2 Day(s) from this visit.   Latest known visit with results is:   Lab on 12/09/2020   Component Date Value Ref Range Status   • SARS-CoV-2 PCR 12/09/2020 Not Detected  Not Detected Final    Nucleic acid specific to SARS-CoV-2 (COVID-19) virus was not detected in  this sample by the TaqPath (TM) COVID-19 Combo Kit.          SARS-CoV-2 (COVID-19) nucleic acid testing performed using [employer] Aptima (R) SARS-CoV-2 Assay or [employer] TaqPath (TM)  COVID-19 Combo Kit as indicated above under Emergency Use Authorization (EUA) from the FDA. Aptima (R) and TaqPath (TM) test performance  characteristics verified by [facility] in accordance with the FDAs Guidance Document (Policy for Diagnostic Tests for Coronavirus Disease-2019  during the Public Health Emergency) issued on March 16, 2020. The laboratory is regulated under CLIA as qualified to perform high-complexity testing  Unless otherwise noted, all testing was performed at [facility], CLIA No. [id number], [place] State Licensee No. [id number]       No results found.    Discharge and Follow up Instructions:     Remain NWB on the operative extremity.    Keep splint clean and dry at all times.    Elevate the extremity as much as possible (\"toes above the level of the nose\").    Try to get up at least once per hour while awake to help prevent blood clots.    Follow up in 2-3 weeks with Dr. Izaguirre at [facility] ([phone]).    Date: 12/12/2020    Brandon Izaguirre Jr, " MD

## 2020-12-12 NOTE — PLAN OF CARE
Problem: Adult Inpatient Plan of Care  Goal: Plan of Care Review  Flowsheets (Taken 12/12/2020 7431)  Outcome Summary:  PT saw patient in their room after talking to the nurse. Per nurse, patient was doing great. Asked about equipment hey already have everything they need at home. Safe to return home from PT standpoint. PT was wearing mask and face shield.   Goal Outcome Evaluation:  Plan of Care Reviewed With: patient  Progress: improving  Outcome Summary: PT saw patientr in their room after talking to the nurse. Per nurse, patient was doing great. Asked about equipment; they already have everything they need at home. Safe to return home from PT standpoint. PT was wearing mask and face shield.

## 2020-12-12 NOTE — PLAN OF CARE
Goal Outcome Evaluation:  Plan of Care Reviewed With: patient  Progress: improving   Stable for D/C

## 2020-12-12 NOTE — PLAN OF CARE
Goal Outcome Evaluation:  Plan of Care Reviewed With: patient  Progress: improving  Outcome Summary: VSS, pain minimal, up with assist on 1, voiding per BSC, RA, SL, NWB to LLE, educated on BP monitoring, home today

## 2020-12-12 NOTE — PROGRESS NOTES
"Orthopaedic Surgery  Daily Progress Note    /64 (BP Location: Right arm, Patient Position: Lying)   Pulse 71   Temp 97.6 °F (36.4 °C) (Skin)   Resp 16   Ht 177.8 cm (70\")   Wt 84.4 kg (186 lb)   SpO2 97%   BMI 26.69 kg/m²     Lab Results (last 24 hours)     ** No results found for the last 24 hours. **          Imaging Results (Last 24 Hours)     Procedure Component Value Units Date/Time    XR Ankle 2 View Left [[id number]] Collected: 12/11/20 0937     Updated: 12/11/20 0941    Narrative:      XR ANKLE 2 VW LEFT-  INTRAOPERATIVE VIEWS 12/11/2020     HISTORY: Left ankle surgery.     Intraoperative views were used during left ankle surgery. Prostheses are  seen along the dome of the talus and distal tibia as well as additional  surgical screws in the distal fibula. No unexpected findings are noted.     Fluoroscopy time 2 minutes. 8 images.     This report was finalized on 12/11/2020 9:38 AM by Dr. Christiano Kingsley M.D.       FL C Arm During Surgery [[id number]] Resulted: 12/11/20 0925     Updated: 12/11/20 0925    Narrative:      This procedure was auto-finalized with no dictation required.          Patient Care Team:  Darian Molina MD as PCP - General (Family Medicine)    SUBJECTIVE  Pain controlled    EXAM:  Resting in NAD  Splint clean, dry, intact  Toes warm, perfused, brisk capillary refill  Flexes/extends toes   Mildly diminished sensibility over toes 2/2 block  No pain with passive stretch             S/P ankle joint replacement, left    Post-traumatic arthritis of left ankle      PLAN / DISPOSITION:  POD 1 s/p L TAR , doing well  1. Pain control: Orals  2.  Antibiotics: Periop Ancef to end today  3.  PT: NWB in splint  4. DVT: ASA 325mg daily plus ELZBIETA/SCDs, mobilization  5. Dispo: home today, follow up 2-3 weeks with me at [facility] ([phone] to make appointment)        Brandon Izaguirre Jr, MD  12/12/20  08:22 EST      "

## 2020-12-12 NOTE — THERAPY EVALUATION
Patient Name: Reinaldo Yen  : 1955    MRN: [id number]                              Today's Date: 2020       Admit Date: 2020    Visit Dx:     ICD-10-CM ICD-9-CM   1. S/P ankle joint replacement, left  Z96.662 V43.66   2. S/P foot surgery  Z98.890 V45.89   3. Post-traumatic arthritis of left ankle  M19.172 716.17     Patient Active Problem List   Diagnosis   • S/P ankle joint replacement, left   • Post-traumatic arthritis of left ankle     Past Medical History:   Diagnosis Date   • Ankle pain, left    • Arthritis    • History of 2019 novel coronavirus disease (COVID-19)     2020, 30   • Hypertension      Past Surgical History:   Procedure Laterality Date   • APPENDECTOMY     • CHOLECYSTECTOMY     • ORIF ANKLE FRACTURE Left    • TOTAL KNEE ARTHROPLASTY Right      General Information    No documentation.       Mobility    No documentation.       Obj/Interventions    No documentation.       Goals/Plan    No documentation.       Clinical Impression    No documentation.       Outcome Measures    No documentation.         PT Recommendation and Plan     Outcome Summary: PT saw patientr in their room after talking to the nurse. Per nurse, patient was doing great. Asked about equipment; they already have everything they need at home. Safe to return home from PT standpoint. PT was wearing mask and face shield.     Time Calculation:   PT Charges     Row Name 20 1336             Time Calculation    Start Time  1100  -AL      Stop Time  1110  -AL      Time Calculation (min)  10 min  -AL      PT Received On  20  -AL        User Key  (r) = Recorded By, (t) = Taken By, (c) = Cosigned By    Initials Name Provider Type    AL Kerry Irby, PT Physical Therapist        Therapy Charges for Today     Code Description Service Date Service Provider Modifiers Qty    [id number] HC PT SELF CARE/MGMT/TRAIN EA 15 MIN 2020 Kerry Irby, PT GP 1               Kerry Irby PT  2020

## 2020-12-14 NOTE — PROGRESS NOTES
Case Management Discharge Note      Final Note: Home with family/friend support. No d/c orders for DME/RH/HH noted.         Selected Continued Care - Discharged on 12/12/2020 Admission date: 12/11/2020 - Discharge disposition: Home or Self Care    Destination    No services have been selected for the patient.              Durable Medical Equipment    No services have been selected for the patient.              Dialysis/Infusion    No services have been selected for the patient.              Home Medical Care    No services have been selected for the patient.              Therapy    No services have been selected for the patient.              Community Resources    No services have been selected for the patient.                       Final Discharge Disposition Code: 01 - home or self-care

## 2022-02-23 ENCOUNTER — TRANSCRIBE ORDERS (OUTPATIENT)
Dept: ADMINISTRATIVE | Facility: HOSPITAL | Age: 67
End: 2022-02-23

## 2022-02-23 ENCOUNTER — LAB (OUTPATIENT)
Dept: LAB | Facility: HOSPITAL | Age: 67
End: 2022-02-23

## 2022-02-23 ENCOUNTER — HOSPITAL ENCOUNTER (OUTPATIENT)
Dept: CARDIOLOGY | Facility: HOSPITAL | Age: 67
Discharge: HOME | End: 2022-02-23

## 2022-02-23 DIAGNOSIS — Z01.818 PRE-OP TESTING: Primary | ICD-10-CM

## 2022-02-23 DIAGNOSIS — Z01.818 PRE-OP TESTING: ICD-10-CM

## 2022-02-23 LAB
ANION GAP SERPL CALCULATED.3IONS-SCNC: 9 MMOL/L (ref 5–15)
BASOPHILS # BLD AUTO: 0.05 10*3/MM3 (ref 0–0.2)
BASOPHILS NFR BLD AUTO: 0.8 % (ref 0–1.5)
BUN SERPL-MCNC: 12 MG/DL (ref 8–23)
BUN/CREAT SERPL: 17.4 (ref 7–25)
CALCIUM SPEC-SCNC: 9.6 MG/DL (ref 8.6–10.5)
CHLORIDE SERPL-SCNC: 105 MMOL/L (ref 98–107)
CO2 SERPL-SCNC: 29 MMOL/L (ref 22–29)
CREAT SERPL-MCNC: 0.69 MG/DL (ref 0.57–1)
DEPRECATED RDW RBC AUTO: 38.7 FL (ref 37–54)
EOSINOPHIL # BLD AUTO: 0.08 10*3/MM3 (ref 0–0.4)
EOSINOPHIL NFR BLD AUTO: 1.2 % (ref 0.3–6.2)
ERYTHROCYTE [DISTWIDTH] IN BLOOD BY AUTOMATED COUNT: 12.7 % (ref 12.3–15.4)
GFR SERPL CREATININE-BSD FRML MDRD: 85 ML/MIN/1.73
GLUCOSE SERPL-MCNC: 80 MG/DL (ref 65–99)
HCT VFR BLD AUTO: 43.4 % (ref 34–46.6)
HGB BLD-MCNC: 14.8 G/DL (ref 12–15.9)
IMM GRANULOCYTES # BLD AUTO: 0.02 10*3/MM3 (ref 0–0.05)
IMM GRANULOCYTES NFR BLD AUTO: 0.3 % (ref 0–0.5)
LYMPHOCYTES # BLD AUTO: 2.11 10*3/MM3 (ref 0.7–3.1)
LYMPHOCYTES NFR BLD AUTO: 31.9 % (ref 19.6–45.3)
MCH RBC QN AUTO: 28.7 PG (ref 26.6–33)
MCHC RBC AUTO-ENTMCNC: 34.1 G/DL (ref 31.5–35.7)
MCV RBC AUTO: 84.1 FL (ref 79–97)
MONOCYTES # BLD AUTO: 0.45 10*3/MM3 (ref 0.1–0.9)
MONOCYTES NFR BLD AUTO: 6.8 % (ref 5–12)
NEUTROPHILS NFR BLD AUTO: 3.9 10*3/MM3 (ref 1.7–7)
NEUTROPHILS NFR BLD AUTO: 59 % (ref 42.7–76)
NRBC BLD AUTO-RTO: 0 /100 WBC (ref 0–0.2)
PLATELET # BLD AUTO: 274 10*3/MM3 (ref 140–450)
PMV BLD AUTO: 10.3 FL (ref 6–12)
POTASSIUM SERPL-SCNC: 4 MMOL/L (ref 3.5–5.2)
RBC # BLD AUTO: 5.16 10*6/MM3 (ref 3.77–5.28)
SODIUM SERPL-SCNC: 143 MMOL/L (ref 136–145)
WBC NRBC COR # BLD: 6.61 10*3/MM3 (ref 3.4–10.8)

## 2022-02-23 PROCEDURE — 85025 COMPLETE CBC W/AUTO DIFF WBC: CPT

## 2022-02-23 PROCEDURE — 80048 BASIC METABOLIC PNL TOTAL CA: CPT

## 2022-02-23 PROCEDURE — 36415 COLL VENOUS BLD VENIPUNCTURE: CPT

## 2022-02-23 PROCEDURE — 93005 ELECTROCARDIOGRAM TRACING: CPT

## 2022-02-23 PROCEDURE — 93010 ELECTROCARDIOGRAM REPORT: CPT | Performed by: INTERNAL MEDICINE

## 2022-02-25 LAB — QT INTERVAL: 405 MS

## 2024-02-22 ENCOUNTER — PRE-ADMISSION TESTING (OUTPATIENT)
Dept: PREADMISSION TESTING | Facility: HOSPITAL | Age: 69
End: 2024-02-22
Payer: MEDICARE

## 2024-02-22 VITALS
TEMPERATURE: 97.2 F | RESPIRATION RATE: 16 BRPM | SYSTOLIC BLOOD PRESSURE: 162 MMHG | HEIGHT: 69 IN | WEIGHT: 180 LBS | DIASTOLIC BLOOD PRESSURE: 78 MMHG | HEART RATE: 84 BPM | OXYGEN SATURATION: 97 % | BODY MASS INDEX: 26.66 KG/M2

## 2024-02-22 LAB
ALBUMIN SERPL-MCNC: 4.8 G/DL (ref 3.5–5.2)
ALBUMIN/GLOB SERPL: 2.2 G/DL
ALP SERPL-CCNC: 80 U/L (ref 39–117)
ALT SERPL W P-5'-P-CCNC: 17 U/L (ref 1–33)
ANION GAP SERPL CALCULATED.3IONS-SCNC: 9 MMOL/L (ref 5–15)
AST SERPL-CCNC: 20 U/L (ref 1–32)
BASOPHILS # BLD AUTO: 0.04 10*3/MM3 (ref 0–0.2)
BASOPHILS NFR BLD AUTO: 0.6 % (ref 0–1.5)
BILIRUB SERPL-MCNC: 0.4 MG/DL (ref 0–1.2)
BUN SERPL-MCNC: 17 MG/DL (ref 8–23)
BUN/CREAT SERPL: 21.5 (ref 7–25)
CALCIUM SPEC-SCNC: 9.7 MG/DL (ref 8.6–10.5)
CHLORIDE SERPL-SCNC: 103 MMOL/L (ref 98–107)
CO2 SERPL-SCNC: 26 MMOL/L (ref 22–29)
CREAT SERPL-MCNC: 0.79 MG/DL (ref 0.57–1)
DEPRECATED RDW RBC AUTO: 39.4 FL (ref 37–54)
EGFRCR SERPLBLD CKD-EPI 2021: 81.1 ML/MIN/1.73
EOSINOPHIL # BLD AUTO: 0.07 10*3/MM3 (ref 0–0.4)
EOSINOPHIL NFR BLD AUTO: 1 % (ref 0.3–6.2)
ERYTHROCYTE [DISTWIDTH] IN BLOOD BY AUTOMATED COUNT: 13 % (ref 12.3–15.4)
GLOBULIN UR ELPH-MCNC: 2.2 GM/DL
GLUCOSE SERPL-MCNC: 93 MG/DL (ref 65–99)
HCT VFR BLD AUTO: 41.9 % (ref 34–46.6)
HGB BLD-MCNC: 14.1 G/DL (ref 12–15.9)
IMM GRANULOCYTES # BLD AUTO: 0.03 10*3/MM3 (ref 0–0.05)
IMM GRANULOCYTES NFR BLD AUTO: 0.4 % (ref 0–0.5)
LYMPHOCYTES # BLD AUTO: 2.02 10*3/MM3 (ref 0.7–3.1)
LYMPHOCYTES NFR BLD AUTO: 27.9 % (ref 19.6–45.3)
MCH RBC QN AUTO: 28.1 PG (ref 26.6–33)
MCHC RBC AUTO-ENTMCNC: 33.7 G/DL (ref 31.5–35.7)
MCV RBC AUTO: 83.6 FL (ref 79–97)
MONOCYTES # BLD AUTO: 0.45 10*3/MM3 (ref 0.1–0.9)
MONOCYTES NFR BLD AUTO: 6.2 % (ref 5–12)
NEUTROPHILS NFR BLD AUTO: 4.62 10*3/MM3 (ref 1.7–7)
NEUTROPHILS NFR BLD AUTO: 63.9 % (ref 42.7–76)
NRBC BLD AUTO-RTO: 0 /100 WBC (ref 0–0.2)
PLATELET # BLD AUTO: 259 10*3/MM3 (ref 140–450)
PMV BLD AUTO: 9.7 FL (ref 6–12)
POTASSIUM SERPL-SCNC: 4 MMOL/L (ref 3.5–5.2)
PROT SERPL-MCNC: 7 G/DL (ref 6–8.5)
RBC # BLD AUTO: 5.01 10*6/MM3 (ref 3.77–5.28)
SODIUM SERPL-SCNC: 138 MMOL/L (ref 136–145)
WBC NRBC COR # BLD AUTO: 7.23 10*3/MM3 (ref 3.4–10.8)

## 2024-02-22 PROCEDURE — 93005 ELECTROCARDIOGRAM TRACING: CPT

## 2024-02-22 PROCEDURE — 80053 COMPREHEN METABOLIC PANEL: CPT

## 2024-02-22 PROCEDURE — 85025 COMPLETE CBC W/AUTO DIFF WBC: CPT

## 2024-02-22 PROCEDURE — 36415 COLL VENOUS BLD VENIPUNCTURE: CPT

## 2024-02-22 PROCEDURE — 93010 ELECTROCARDIOGRAM REPORT: CPT | Performed by: INTERNAL MEDICINE

## 2024-02-22 NOTE — DISCHARGE INSTRUCTIONS
Take the following medications the morning of surgery:    NO MEDS AM OF SURGERY    TIME OF ARRIVAL 11:30      If you are on prescription narcotic pain medication to control your pain you may also take that medication the morning of surgery.    General Instructions:  Do not eat solid food after midnight the night before surgery.  You may drink clear liquids day of surgery but must stop at least one hour before your hospital arrival time.  It is beneficial for you to have a clear drink that contains carbohydrates the day of surgery.  We suggest a 12 to 20 ounce bottle of Gatorade or Powerade for non-diabetic patients or a 12 to 20 ounce bottle of G2 or Powerade Zero for diabetic patients. (Pediatric patients, are not advised to drink a 12 to 20 ounce carbohydrate drink)    Clear liquids are liquids you can see through.  Nothing red in color.     Plain water                               Sports drinks  Sodas                                   Gelatin (Jell-O)  Fruit juices without pulp such as white grape juice and apple juice  Popsicles that contain no fruit or yogurt  Tea or coffee (no cream or milk added)  Gatorade / Powerade  G2 / Powerade Zero    Infants may have breast milk up to four hours before surgery.  Infants drinking formula may drink formula up to six hours before surgery.   Patients who avoid smoking, chewing tobacco and alcohol for 4 weeks prior to surgery have a reduced risk of post-operative complications.  Quit smoking as many days before surgery as you can.  Do not smoke, use chewing tobacco or drink alcohol the day of surgery.   If applicable bring your C-PAP/ BI-PAP machine in with you to preop day of surgery.  Bring any papers given to you in the doctor’s office.  Wear clean comfortable clothes.  Do not wear contact lenses, false eyelashes or make-up.  Bring a case for your glasses.   Bring crutches or walker if applicable.  Remove all piercings.  Leave jewelry and any other valuables at  home.  Hair extensions with metal clips must be removed prior to surgery.  The Pre-Admission Testing nurse will instruct you to bring medications if unable to obtain an accurate list in Pre-Admission Testing.          Preventing a Surgical Site Infection:  For 2 to 3 days before surgery, avoid shaving with a razor because the razor can irritate skin and make it easier to develop an infection.    Any areas of open skin can increase the risk of a post-operative wound infection by allowing bacteria to enter and travel throughout the body.  Notify your surgeon if you have any skin wounds / rashes even if it is not near the expected surgical site.  The area will need assessed to determine if surgery should be delayed until it is healed.  The night prior to surgery shower using a fresh bar of anti-bacterial soap (such as Dial) and clean washcloth.  Sleep in a clean bed with clean clothing.  Do not allow pets to sleep with you.  Shower on the morning of surgery using a fresh bar of anti-bacterial soap (such as Dial) and clean washcloth.  Dry with a clean towel and dress in clean clothing.  Ask your surgeon if you will be receiving antibiotics prior to surgery.  Make sure you, your family, and all healthcare providers clean their hands with soap and water or an alcohol based hand  before caring for you or your wound.    Day of surgery:  Your arrival time is approximately two hours before your scheduled surgery time.  Upon arrival, a Pre-op nurse and Anesthesiologist will review your health history, obtain vital signs, and answer questions you may have.  The only belongings needed at this time will be a list of your home medications and if applicable your C-PAP/BI-PAP machine.  A Pre-op nurse will start an IV and you may receive medication in preparation for surgery, including something to help you relax.     Please be aware that surgery does come with discomfort.  We want to make every effort to control your  discomfort so please discuss any uncontrolled symptoms with your nurse.   Your doctor will most likely have prescribed pain medications.      If you are going home after surgery you will receive individualized written care instructions before being discharged.  A responsible adult must drive you to and from the hospital on the day of your surgery and ideally stay with you through the night.   .  Discharge prescriptions can be filled by the hospital pharmacy during regular pharmacy hours.  If you are having surgery late in the day/evening your prescription may be e-prescribed to your pharmacy.  Please verify your pharmacy hours or chose a 24 hour pharmacy to avoid not having access to your prescription because your pharmacy has closed for the day.    If you are staying overnight following surgery, you will be transported to your hospital room following the recovery period.  [facility] has all private rooms.    If you have any questions please call Pre-Admission Testing at [phone].  Deductibles and co-payments are collected on the day of service. Please be prepared to pay the required co-pay, deductible or deposit on the day of service as defined by your plan.    Call your surgeon immediately if you experience any of the following symptoms:  Sore Throat  Shortness of Breath or difficulty breathing  Cough  Chills  Body soreness or muscle pain  Headache  Fever  New loss of taste or smell  Do not arrive for your surgery ill.  Your procedure will need to be rescheduled to another time.  You will need to call your physician before the day of surgery to avoid any unnecessary exposure to hospital staff as well as other patients.

## 2024-02-23 LAB
QT INTERVAL: 397 MS
QTC INTERVAL: 438 MS

## 2024-02-25 RX ORDER — CEFAZOLIN SODIUM 2 G/100ML
2000 INJECTION, SOLUTION INTRAVENOUS ONCE
Status: COMPLETED | OUTPATIENT
Start: 2024-02-25 | End: 2024-02-26

## 2024-02-26 ENCOUNTER — APPOINTMENT (OUTPATIENT)
Dept: GENERAL RADIOLOGY | Facility: HOSPITAL | Age: 69
End: 2024-02-26
Payer: MEDICARE

## 2024-02-26 ENCOUNTER — ANESTHESIA EVENT (OUTPATIENT)
Dept: PERIOP | Facility: HOSPITAL | Age: 69
End: 2024-02-26
Payer: MEDICARE

## 2024-02-26 ENCOUNTER — ANESTHESIA (OUTPATIENT)
Dept: PERIOP | Facility: HOSPITAL | Age: 69
End: 2024-02-26
Payer: MEDICARE

## 2024-02-26 ENCOUNTER — HOSPITAL ENCOUNTER (OUTPATIENT)
Facility: HOSPITAL | Age: 69
Setting detail: HOSPITAL OUTPATIENT SURGERY
Discharge: HOME | End: 2024-02-26
Attending: ORTHOPAEDIC SURGERY | Admitting: ORTHOPAEDIC SURGERY
Payer: MEDICARE

## 2024-02-26 VITALS
TEMPERATURE: 97.4 F | RESPIRATION RATE: 16 BRPM | DIASTOLIC BLOOD PRESSURE: 80 MMHG | HEART RATE: 73 BPM | OXYGEN SATURATION: 100 % | SYSTOLIC BLOOD PRESSURE: 145 MMHG

## 2024-02-26 DIAGNOSIS — M19.072 ARTHRITIS OF LEFT SUBTALAR JOINT: Primary | ICD-10-CM

## 2024-02-26 PROCEDURE — 25010000002 ONDANSETRON PER 1 MG: Performed by: NURSE ANESTHETIST, CERTIFIED REGISTERED

## 2024-02-26 PROCEDURE — 25010000002 FENTANYL CITRATE (PF) 50 MCG/ML SOLUTION: Performed by: NURSE ANESTHETIST, CERTIFIED REGISTERED

## 2024-02-26 PROCEDURE — 25010000002 CEFAZOLIN IN DEXTROSE 2-4 GM/100ML-% SOLUTION: Performed by: ORTHOPAEDIC SURGERY

## 2024-02-26 PROCEDURE — C1713 ANCHOR/SCREW BN/BN,TIS/BN: HCPCS | Performed by: ORTHOPAEDIC SURGERY

## 2024-02-26 PROCEDURE — C1734 ORTH/DEVIC/DRUG BN/BN,TIS/BN: HCPCS | Performed by: ORTHOPAEDIC SURGERY

## 2024-02-26 PROCEDURE — C1769 GUIDE WIRE: HCPCS | Performed by: ORTHOPAEDIC SURGERY

## 2024-02-26 PROCEDURE — 76000 FLUOROSCOPY <1 HR PHYS/QHP: CPT

## 2024-02-26 PROCEDURE — 25010000002 ROPIVACAINE PER 1 MG: Performed by: ANESTHESIOLOGY

## 2024-02-26 PROCEDURE — 25010000002 PROPOFOL 200 MG/20ML EMULSION: Performed by: NURSE ANESTHETIST, CERTIFIED REGISTERED

## 2024-02-26 PROCEDURE — 25010000002 DEXAMETHASONE PER 1 MG: Performed by: ANESTHESIOLOGY

## 2024-02-26 RX ORDER — FLUMAZENIL 0.1 MG/ML
0.2 INJECTION INTRAVENOUS AS NEEDED
Status: DISCONTINUED | OUTPATIENT
Start: 2024-02-26 | End: 2024-02-26 | Stop reason: HOSPADM

## 2024-02-26 RX ORDER — MIDAZOLAM HYDROCHLORIDE 1 MG/ML
0.5 INJECTION INTRAMUSCULAR; INTRAVENOUS
Status: DISCONTINUED | OUTPATIENT
Start: 2024-02-26 | End: 2024-02-26 | Stop reason: HOSPADM

## 2024-02-26 RX ORDER — ONDANSETRON 4 MG/1
4 TABLET, FILM COATED ORAL EVERY 8 HOURS PRN
Qty: 20 TABLET | Refills: 0 | Status: SHIPPED | OUTPATIENT
Start: 2024-02-26 | End: 2024-03-27

## 2024-02-26 RX ORDER — MAGNESIUM HYDROXIDE 1200 MG/15ML
LIQUID ORAL AS NEEDED
Status: DISCONTINUED | OUTPATIENT
Start: 2024-02-26 | End: 2024-02-26 | Stop reason: HOSPADM

## 2024-02-26 RX ORDER — DIPHENHYDRAMINE HYDROCHLORIDE 50 MG/ML
12.5 INJECTION INTRAMUSCULAR; INTRAVENOUS
Status: DISCONTINUED | OUTPATIENT
Start: 2024-02-26 | End: 2024-02-26 | Stop reason: HOSPADM

## 2024-02-26 RX ORDER — SODIUM CHLORIDE 0.9 % (FLUSH) 0.9 %
3 SYRINGE (ML) INJECTION EVERY 12 HOURS SCHEDULED
Status: DISCONTINUED | OUTPATIENT
Start: 2024-02-26 | End: 2024-02-26 | Stop reason: HOSPADM

## 2024-02-26 RX ORDER — PROMETHAZINE HYDROCHLORIDE 25 MG/1
25 SUPPOSITORY RECTAL ONCE AS NEEDED
Status: DISCONTINUED | OUTPATIENT
Start: 2024-02-26 | End: 2024-02-26 | Stop reason: HOSPADM

## 2024-02-26 RX ORDER — PROMETHAZINE HYDROCHLORIDE 25 MG/1
25 TABLET ORAL ONCE AS NEEDED
Status: DISCONTINUED | OUTPATIENT
Start: 2024-02-26 | End: 2024-02-26 | Stop reason: HOSPADM

## 2024-02-26 RX ORDER — ONDANSETRON 2 MG/ML
4 INJECTION INTRAMUSCULAR; INTRAVENOUS ONCE AS NEEDED
Status: DISCONTINUED | OUTPATIENT
Start: 2024-02-26 | End: 2024-02-26 | Stop reason: HOSPADM

## 2024-02-26 RX ORDER — DEXAMETHASONE SODIUM PHOSPHATE 4 MG/ML
INJECTION, SOLUTION INTRA-ARTICULAR; INTRALESIONAL; INTRAMUSCULAR; INTRAVENOUS; SOFT TISSUE
Status: DISCONTINUED | OUTPATIENT
Start: 2024-02-26 | End: 2024-02-26 | Stop reason: SURG

## 2024-02-26 RX ORDER — DROPERIDOL 2.5 MG/ML
0.62 INJECTION, SOLUTION INTRAMUSCULAR; INTRAVENOUS
Status: DISCONTINUED | OUTPATIENT
Start: 2024-02-26 | End: 2024-02-26 | Stop reason: HOSPADM

## 2024-02-26 RX ORDER — ROPIVACAINE HYDROCHLORIDE 5 MG/ML
INJECTION, SOLUTION EPIDURAL; INFILTRATION; PERINEURAL
Status: DISCONTINUED | OUTPATIENT
Start: 2024-02-26 | End: 2024-02-26 | Stop reason: SURG

## 2024-02-26 RX ORDER — NALOXONE HCL 0.4 MG/ML
0.2 VIAL (ML) INJECTION AS NEEDED
Status: DISCONTINUED | OUTPATIENT
Start: 2024-02-26 | End: 2024-02-26 | Stop reason: HOSPADM

## 2024-02-26 RX ORDER — IPRATROPIUM BROMIDE AND ALBUTEROL SULFATE 2.5; .5 MG/3ML; MG/3ML
3 SOLUTION RESPIRATORY (INHALATION) ONCE AS NEEDED
Status: DISCONTINUED | OUTPATIENT
Start: 2024-02-26 | End: 2024-02-26 | Stop reason: HOSPADM

## 2024-02-26 RX ORDER — ONDANSETRON 2 MG/ML
INJECTION INTRAMUSCULAR; INTRAVENOUS AS NEEDED
Status: DISCONTINUED | OUTPATIENT
Start: 2024-02-26 | End: 2024-02-26 | Stop reason: SURG

## 2024-02-26 RX ORDER — ASPIRIN 325 MG
325 TABLET, DELAYED RELEASE (ENTERIC COATED) ORAL DAILY
Qty: 30 TABLET | Refills: 0 | Status: SHIPPED | OUTPATIENT
Start: 2024-02-26

## 2024-02-26 RX ORDER — PROPOFOL 10 MG/ML
INJECTION, EMULSION INTRAVENOUS AS NEEDED
Status: DISCONTINUED | OUTPATIENT
Start: 2024-02-26 | End: 2024-02-26 | Stop reason: SURG

## 2024-02-26 RX ORDER — FENTANYL CITRATE 50 UG/ML
50 INJECTION, SOLUTION INTRAMUSCULAR; INTRAVENOUS ONCE AS NEEDED
Status: DISCONTINUED | OUTPATIENT
Start: 2024-02-26 | End: 2024-02-26 | Stop reason: HOSPADM

## 2024-02-26 RX ORDER — HYDROCODONE BITARTRATE AND ACETAMINOPHEN 5; 325 MG/1; MG/1
1 TABLET ORAL ONCE AS NEEDED
Status: DISCONTINUED | OUTPATIENT
Start: 2024-02-26 | End: 2024-02-26 | Stop reason: HOSPADM

## 2024-02-26 RX ORDER — HYDRALAZINE HYDROCHLORIDE 20 MG/ML
5 INJECTION INTRAMUSCULAR; INTRAVENOUS
Status: DISCONTINUED | OUTPATIENT
Start: 2024-02-26 | End: 2024-02-26 | Stop reason: HOSPADM

## 2024-02-26 RX ORDER — FENTANYL CITRATE 50 UG/ML
INJECTION, SOLUTION INTRAMUSCULAR; INTRAVENOUS AS NEEDED
Status: DISCONTINUED | OUTPATIENT
Start: 2024-02-26 | End: 2024-02-26 | Stop reason: SURG

## 2024-02-26 RX ORDER — SODIUM CHLORIDE 0.9 % (FLUSH) 0.9 %
3-10 SYRINGE (ML) INJECTION AS NEEDED
Status: DISCONTINUED | OUTPATIENT
Start: 2024-02-26 | End: 2024-02-26 | Stop reason: HOSPADM

## 2024-02-26 RX ORDER — EPHEDRINE SULFATE 50 MG/ML
5 INJECTION, SOLUTION INTRAVENOUS ONCE AS NEEDED
Status: DISCONTINUED | OUTPATIENT
Start: 2024-02-26 | End: 2024-02-26 | Stop reason: HOSPADM

## 2024-02-26 RX ORDER — FAMOTIDINE 10 MG/ML
20 INJECTION, SOLUTION INTRAVENOUS ONCE
Status: DISCONTINUED | OUTPATIENT
Start: 2024-02-26 | End: 2024-02-26

## 2024-02-26 RX ORDER — LIDOCAINE HYDROCHLORIDE 20 MG/ML
INJECTION, SOLUTION EPIDURAL; INFILTRATION; INTRACAUDAL; PERINEURAL AS NEEDED
Status: DISCONTINUED | OUTPATIENT
Start: 2024-02-26 | End: 2024-02-26 | Stop reason: SURG

## 2024-02-26 RX ORDER — FENTANYL CITRATE 50 UG/ML
25 INJECTION, SOLUTION INTRAMUSCULAR; INTRAVENOUS
Status: DISCONTINUED | OUTPATIENT
Start: 2024-02-26 | End: 2024-02-26 | Stop reason: HOSPADM

## 2024-02-26 RX ORDER — OXYCODONE AND ACETAMINOPHEN 7.5; 325 MG/1; MG/1
1 TABLET ORAL EVERY 4 HOURS PRN
Qty: 34 TABLET | Refills: 0 | Status: SHIPPED | OUTPATIENT
Start: 2024-02-26 | End: 2025-02-25

## 2024-02-26 RX ORDER — LABETALOL HYDROCHLORIDE 5 MG/ML
5 INJECTION, SOLUTION INTRAVENOUS
Status: DISCONTINUED | OUTPATIENT
Start: 2024-02-26 | End: 2024-02-26 | Stop reason: HOSPADM

## 2024-02-26 RX ORDER — HYDROMORPHONE HYDROCHLORIDE 1 MG/ML
0.25 INJECTION, SOLUTION INTRAMUSCULAR; INTRAVENOUS; SUBCUTANEOUS
Status: DISCONTINUED | OUTPATIENT
Start: 2024-02-26 | End: 2024-02-26 | Stop reason: HOSPADM

## 2024-02-26 RX ORDER — HYDROCODONE BITARTRATE AND ACETAMINOPHEN 7.5; 325 MG/1; MG/1
1 TABLET ORAL EVERY 4 HOURS PRN
Status: DISCONTINUED | OUTPATIENT
Start: 2024-02-26 | End: 2024-02-26 | Stop reason: HOSPADM

## 2024-02-26 RX ORDER — SODIUM CHLORIDE, SODIUM LACTATE, POTASSIUM CHLORIDE, CALCIUM CHLORIDE 600; 310; 30; 20 MG/100ML; MG/100ML; MG/100ML; MG/100ML
9 INJECTION, SOLUTION INTRAVENOUS CONTINUOUS
Status: DISCONTINUED | OUTPATIENT
Start: 2024-02-26 | End: 2024-02-26 | Stop reason: HOSPADM

## 2024-02-26 RX ORDER — LIDOCAINE HYDROCHLORIDE 10 MG/ML
0.5 INJECTION, SOLUTION INFILTRATION; PERINEURAL ONCE AS NEEDED
Status: DISCONTINUED | OUTPATIENT
Start: 2024-02-26 | End: 2024-02-26 | Stop reason: HOSPADM

## 2024-02-26 RX ADMIN — FENTANYL CITRATE 25 MCG: 50 INJECTION, SOLUTION INTRAMUSCULAR; INTRAVENOUS at 16:02

## 2024-02-26 RX ADMIN — ONDANSETRON 4 MG: 2 INJECTION INTRAMUSCULAR; INTRAVENOUS at 15:08

## 2024-02-26 RX ADMIN — ROPIVACAINE HYDROCHLORIDE 20 ML: 5 INJECTION, SOLUTION EPIDURAL; INFILTRATION; PERINEURAL at 15:42

## 2024-02-26 RX ADMIN — PROPOFOL 150 MG: 10 INJECTION, EMULSION INTRAVENOUS at 14:04

## 2024-02-26 RX ADMIN — LIDOCAINE HYDROCHLORIDE 60 MG: 20 INJECTION, SOLUTION EPIDURAL; INFILTRATION; INTRACAUDAL; PERINEURAL at 14:04

## 2024-02-26 RX ADMIN — DEXAMETHASONE SODIUM PHOSPHATE 4 MG: 4 INJECTION, SOLUTION INTRA-ARTICULAR; INTRALESIONAL; INTRAMUSCULAR; INTRAVENOUS; SOFT TISSUE at 15:43

## 2024-02-26 RX ADMIN — HYDROCODONE BITARTRATE AND ACETAMINOPHEN 1 TABLET: 7.5; 325 TABLET ORAL at 16:03

## 2024-02-26 RX ADMIN — DEXAMETHASONE SODIUM PHOSPHATE 4 MG: 4 INJECTION, SOLUTION INTRA-ARTICULAR; INTRALESIONAL; INTRAMUSCULAR; INTRAVENOUS; SOFT TISSUE at 15:35

## 2024-02-26 RX ADMIN — ROPIVACAINE HYDROCHLORIDE 20 ML: 5 INJECTION EPIDURAL; INFILTRATION; PERINEURAL at 15:35

## 2024-02-26 RX ADMIN — CEFAZOLIN SODIUM 2000 MG: 2 INJECTION, SOLUTION INTRAVENOUS at 13:51

## 2024-02-26 RX ADMIN — FENTANYL CITRATE 50 MCG: 50 INJECTION, SOLUTION INTRAMUSCULAR; INTRAVENOUS at 14:23

## 2024-02-26 NOTE — PERIOPERATIVE NURSING NOTE
1530 Time out for peripheral nerve blocks with Dr Cornell for correct patient, procedure to be performed and laterality. Dr Cornell explained procedure to patient with questions asked and answered. See Dr Cornell's note for details. 1542 end blocking.

## 2024-02-26 NOTE — ANESTHESIA POSTPROCEDURE EVALUATION
Patient: Reinaldo Yen    Procedure Summary       Date: 02/26/24 Room / Location:  [facility] [address] [place] [address] [facility]    Anesthesia Start: 1358 Anesthesia Stop: 1524    Procedure: LEFT SUBTALAR ARTHRODESIS (Left: Ankle) Diagnosis:     Surgeons: Brandon Izaguirre Jr., MD Provider: Leonardo Cornell MD    Anesthesia Type: general ASA Status: 2            Anesthesia Type: general    Vitals  Vitals Value Taken Time   /79 02/26/24 1630   Temp 36.3 °C (97.4 °F) 02/26/24 1520   Pulse 73 02/26/24 1645   Resp 16 02/26/24 1520   SpO2 98 % 02/26/24 1645   Vitals shown include unfiled device data.        Post Anesthesia Care and Evaluation    Patient location during evaluation: PACU  Patient participation: complete - patient participated  Level of consciousness: awake  Pain management: adequate    Airway patency: patent  Anesthetic complications: No anesthetic complications  PONV Status: none  Cardiovascular status: acceptable  Respiratory status: acceptable  Hydration status: acceptable

## 2024-02-26 NOTE — OP NOTE
Procedure Note    Reinaldo Yen  2/26/2024    Pre-op Diagnosis:   Left subtalar arthritis       Post-Op Diagnosis Codes:  Same    Procedure:  Left subtalar arthrodesis (10724)      Surgeon(s):  Brandon Izaguirre Jr., MD    Assistants:  None    Anesthesia: LMA plus regional block    Estimated Blood Loss: minimal    Specimens:                None     Drains: * No LDAs found *    Complications:   None apparent    Disposition:  Stable to PACU for recovery    Indications for procedure:  The patient is a pleasant 68-year-old female who undergone prior successful left total ankle arthroplasty.  She had done extremely well from that perspective and has been extremely pleased with her results.  However, she developed progressive pain related to underlying subtalar arthritis below the ankle replacement.  Her symptoms were refractory to prolonged nonoperative management.  She requested surgical intervention.  The above listed procedures were recommended. The risks/benefits of surgery, including pain, infection, wound healing problems, need for future procedures, mal/nonunion, DVT/PE, cardiac event, and/or death were discussed, and the patient elected to proceed with surgery.    Procedure in detail:  The correct patient was identified in preoperative holding.  All risks and benefits of surgery were again discussed in detail, and the patient agreed to proceed with surgery.  The operative extremity was confirmed and marked by myself.  Operative consent reviewed and confirmed to be signed by the patient and myself.    At this time, the patient was wheeled to the operative theatre and placed supine on the OR table.  ELZBIETA/SCD placed on nonoperative leg. Anesthesia was induced smoothly by our anesthesia colleagues.   Well padded nonsterile tourniquet placed on the upper thigh. The left lower extremity was prepped and draped in standard sterile fashion.  Appropriate presurgical timeout was performed, confirming correct patient, correct  extremity, correct procedure, availability of sterile instruments/implants, and the administration of intravenous antibiotics in the form of Ancef within one hour of skin incision.      At this time, a sinus tarsi incision was made with a 15 blade.    Dissection was carried down bluntly to the subtalar joint.  The peroneals were retracted and protected inferiorly.    Joint distractor placed.    Residual cartilage removed.    Joint surfaces were then prepared for arthrodesis by fenestrating them multiple times with a small drill bit revealing bleeding subchondral bone.  The arthrodesis surfaces were further prepared by using a small osteotome to fish scale the arthrodesis surfaces.     The joint was thoroughly irrigated with sterile saline. Osteo-amp bone graft and WMT Augment was placed in the arthrodesis site.     A guidewire for the [employer] Dynanail Mini Hybrid was placed across the subtalar joint and idealized position.  Excellent placement was confirmed on biplanar fluoroscopy as well as axial heel view.    This was measured using the appropriate depth gauge.  It was drilled to an appropriate depth. A 7.0 x 90 mm Hybrid nail was selected.  This was advanced into appropriate position using the screwdriver handle, with the threads obtaining excellent purchase.  The guide outrigger was then carefully affixed to the nail.  I then used the compression knob to externally apply excellent compression across the subtalar joint.  The calcaneal interlock was then placed through a small stab incision through the guide outrigger/trochars.  The guide outrigger was then removed.        At this time, final fluoroscopic images including AP, oblique, lateral ankle views as well as Burger heel view showed excellent joint apposition and alignment and appropriate hardware placement in all planes.  Nearly the entire compressive element remained in the nitinol nail.        The wound was carefully irrigated.  It was closed with 3-0  "Vicryl in the subcutaneous layer and staples in the skin.  Stab incisions for screw placement and nail placement were closed with staples.  Xeroform, sterile gauze, and a well-padded soft dressing were applied followed by a posterior slab plaster splint with the ankle in neutral position.  Tourniquet let down.  Brisk capillary refill returned to all toes.  Patient was awoken from anesthesia without apparent complication taken to PACU for recovery.    Postoperative Plan:  Weightbearing status: Non-weightbearing in the splint  DVT Prophylaxis: Aspirin 325mg daily;  Patient will be instructed to elevate as much as possible (\"toes above the nose\") and to get up and move around at least once per hour while awake to help minimize risk of blood clots.           Brandon Izaguirre Jr, MD     Date: 2/26/2024  Time: 14:13 EST        "

## 2024-02-26 NOTE — ANESTHESIA PROCEDURE NOTES
Airway  Urgency: elective    Date/Time: 2/26/2024 2:06 PM  Airway not difficult    General Information and Staff    Patient location during procedure: OR  CRNA/CAA: Cherelle Connors CRNA    Indications and Patient Condition    Preoxygenated: yes      Final Airway Details  Final airway type: supraglottic airway      Successful airway: LMA  Size 4     Number of attempts at approach: 1  Assessment: lips, teeth, and gum same as pre-op and atraumatic intubation

## 2024-02-26 NOTE — ANESTHESIA PROCEDURE NOTES
Peripheral Block      Patient reassessed immediately prior to procedure    Patient location during procedure: pre-op  Start time: 2/26/2024 3:35 PM  Stop time: 2/26/2024 3:42 PM  Reason for block: procedure for pain, at surgeon's request and post-op pain management  Performed by  Anesthesiologist: Leonardo Cornell MD  Preanesthetic Checklist  Completed: patient identified, IV checked, site marked, risks and benefits discussed, surgical consent, monitors and equipment checked, pre-op evaluation and timeout performed  Prep:  Pt Position: supine  Sterile barriers:cap, gloves, sterile barriers, washed/disinfected hands and mask  Prep: ChloraPrep  Patient monitoring: blood pressure monitoring, continuous pulse oximetry and EKG  Procedure    Sedation: yes  Performed under: local infiltration  Guidance:ultrasound guided  Images:still images obtained, printed/placed on chart    Laterality:left  Block Type:popliteal  Injection Technique:single-shot  Needle Type:echogenic  Resistance on Injection: none    Medications Used: dexamethasone (DECADRON) injection - Injection   4 mg - 2/26/2024 3:43:00 PM  ropivacaine (NAROPIN) 0.5 % injection - Injection   20 mL - 2/26/2024 3:42:00 PM      Post Assessment  Injection Assessment: negative aspiration for heme, no paresthesia on injection and incremental injection  Patient Tolerance:comfortable throughout block  Complications:no  Additional Notes  USG used to verify needle placement and medication administration

## 2024-02-26 NOTE — ANESTHESIA PROCEDURE NOTES
Peripheral Block      Patient reassessed immediately prior to procedure    Patient location during procedure: pre-op  Start time: 2/26/2024 3:30 PM  Stop time: 2/26/2024 3:35 PM  Reason for block: procedure for pain, at surgeon's request and post-op pain management  Performed by  Anesthesiologist: Leonardo Cornell MD  Preanesthetic Checklist  Completed: patient identified, IV checked, site marked, risks and benefits discussed, surgical consent, monitors and equipment checked, pre-op evaluation and timeout performed  Prep:  Pt Position: supine  Sterile barriers:cap, gloves, sterile barriers, washed/disinfected hands and mask  Prep: ChloraPrep  Patient monitoring: blood pressure monitoring, continuous pulse oximetry and EKG  Procedure    Sedation: yes  Performed under: local infiltration  Guidance:ultrasound guided  Images:still images obtained, printed/placed on chart    Laterality:left  Block Type:adductor canal block  Injection Technique:single-shot  Needle Type:echogenic  Resistance on Injection: none    Medications Used: dexamethasone (DECADRON) injection - Injection   4 mg - 2/26/2024 3:35:00 PM  ropivacaine (NAROPIN) 0.5 % injection - Injection   20 mL - 2/26/2024 3:35:00 PM      Post Assessment  Injection Assessment: negative aspiration for heme, no paresthesia on injection and incremental injection  Patient Tolerance:comfortable throughout block  Complications:no  Additional Notes  USG used to verify needle placement and medication administration

## 2024-08-23 ENCOUNTER — HOSPITAL ENCOUNTER (OUTPATIENT)
Dept: CARDIOLOGY | Facility: HOSPITAL | Age: 69
Discharge: HOME | End: 2024-08-23
Payer: MEDICARE

## 2024-08-23 ENCOUNTER — TRANSCRIBE ORDERS (OUTPATIENT)
Dept: ADMINISTRATIVE | Facility: HOSPITAL | Age: 69
End: 2024-08-23
Payer: MEDICARE

## 2024-08-23 ENCOUNTER — LAB (OUTPATIENT)
Dept: LAB | Facility: HOSPITAL | Age: 69
End: 2024-08-23
Payer: MEDICARE

## 2024-08-23 DIAGNOSIS — Z01.818 PRE-OPERATIVE CLEARANCE: Primary | ICD-10-CM

## 2024-08-23 DIAGNOSIS — Z01.818 PRE-OPERATIVE CLEARANCE: ICD-10-CM

## 2024-08-23 LAB
ALBUMIN SERPL-MCNC: 4.5 G/DL (ref 3.5–5.2)
ANION GAP SERPL CALCULATED.3IONS-SCNC: 11.8 MMOL/L (ref 5–15)
BASOPHILS # BLD AUTO: 0.04 10*3/MM3 (ref 0–0.2)
BASOPHILS NFR BLD AUTO: 0.7 % (ref 0–1.5)
BUN SERPL-MCNC: 15 MG/DL (ref 8–23)
BUN/CREAT SERPL: 19.7 (ref 7–25)
CALCIUM SPEC-SCNC: 10.3 MG/DL (ref 8.6–10.5)
CHLORIDE SERPL-SCNC: 104 MMOL/L (ref 98–107)
CO2 SERPL-SCNC: 25.2 MMOL/L (ref 22–29)
CREAT SERPL-MCNC: 0.76 MG/DL (ref 0.57–1)
DEPRECATED RDW RBC AUTO: 38.6 FL (ref 37–54)
EGFRCR SERPLBLD CKD-EPI 2021: 84.4 ML/MIN/1.73
EOSINOPHIL # BLD AUTO: 0.07 10*3/MM3 (ref 0–0.4)
EOSINOPHIL NFR BLD AUTO: 1.3 % (ref 0.3–6.2)
ERYTHROCYTE [DISTWIDTH] IN BLOOD BY AUTOMATED COUNT: 12.7 % (ref 12.3–15.4)
GLUCOSE SERPL-MCNC: 88 MG/DL (ref 65–99)
HBA1C MFR BLD: 5.5 % (ref 4.8–5.6)
HCT VFR BLD AUTO: 43.8 % (ref 34–46.6)
HGB BLD-MCNC: 14.6 G/DL (ref 12–15.9)
IMM GRANULOCYTES # BLD AUTO: 0.02 10*3/MM3 (ref 0–0.05)
IMM GRANULOCYTES NFR BLD AUTO: 0.4 % (ref 0–0.5)
LYMPHOCYTES # BLD AUTO: 1.58 10*3/MM3 (ref 0.7–3.1)
LYMPHOCYTES NFR BLD AUTO: 28.9 % (ref 19.6–45.3)
MCH RBC QN AUTO: 28.1 PG (ref 26.6–33)
MCHC RBC AUTO-ENTMCNC: 33.3 G/DL (ref 31.5–35.7)
MCV RBC AUTO: 84.4 FL (ref 79–97)
MONOCYTES # BLD AUTO: 0.51 10*3/MM3 (ref 0.1–0.9)
MONOCYTES NFR BLD AUTO: 9.3 % (ref 5–12)
MRSA DNA SPEC QL NAA+PROBE: NORMAL
NEUTROPHILS NFR BLD AUTO: 3.24 10*3/MM3 (ref 1.7–7)
NEUTROPHILS NFR BLD AUTO: 59.4 % (ref 42.7–76)
NRBC BLD AUTO-RTO: 0 /100 WBC (ref 0–0.2)
PLATELET # BLD AUTO: 268 10*3/MM3 (ref 140–450)
PMV BLD AUTO: 10.7 FL (ref 6–12)
POTASSIUM SERPL-SCNC: 4.5 MMOL/L (ref 3.5–5.2)
RBC # BLD AUTO: 5.19 10*6/MM3 (ref 3.77–5.28)
SODIUM SERPL-SCNC: 141 MMOL/L (ref 136–145)
WBC NRBC COR # BLD AUTO: 5.46 10*3/MM3 (ref 3.4–10.8)

## 2024-08-23 PROCEDURE — 36415 COLL VENOUS BLD VENIPUNCTURE: CPT

## 2024-08-23 PROCEDURE — 87641 MR-STAPH DNA AMP PROBE: CPT

## 2024-08-23 PROCEDURE — 82040 ASSAY OF SERUM ALBUMIN: CPT

## 2024-08-23 PROCEDURE — 80048 BASIC METABOLIC PNL TOTAL CA: CPT

## 2024-08-23 PROCEDURE — 85025 COMPLETE CBC W/AUTO DIFF WBC: CPT

## 2024-08-23 PROCEDURE — 93005 ELECTROCARDIOGRAM TRACING: CPT | Performed by: ORTHOPAEDIC SURGERY

## 2024-08-23 PROCEDURE — 83036 HEMOGLOBIN GLYCOSYLATED A1C: CPT

## 2024-08-26 LAB
QT INTERVAL: 419 MS
QTC INTERVAL: 426 MS

## (undated) DEVICE — WIDE SAW BLADE

## (undated) DEVICE — NARROW SAW BLADE

## (undated) DEVICE — DISPOSABLE TOURNIQUET CUFF SINGLE BLADDER, SINGLE PORT AND QUICK CONNECT CONNECTOR: Brand: COLOR CUFF

## (undated) DEVICE — GLV SURG BIOGEL LTX PF 8

## (undated) DEVICE — PAD,ABDOMINAL,8"X10",ST,LF: Brand: MEDLINE

## (undated) DEVICE — GLV SURG SIGNATURE ESSENTIAL PF LTX SZ8

## (undated) DEVICE — DRAPE,U/ SHT,SPLIT,PLAS,STERIL: Brand: MEDLINE

## (undated) DEVICE — RECIPROCATING BLADE, DOUBLE SIDED, OFFSET  (70.0 X 0.64 X 12.6MM)

## (undated) DEVICE — SUT VIC 3/0 CT2 27IN J232H

## (undated) DEVICE — BNDG ELAS ELITE V/CLOSE 6IN 5YD LF STRL

## (undated) DEVICE — SCREW, BONE REMOVER: Brand: INBONE

## (undated) DEVICE — DRP C/ARM 41X74IN

## (undated) DEVICE — SOL ISO/ALC RUB 70PCT 4OZ

## (undated) DEVICE — UNDERCAST PADDING: Brand: DEROYAL

## (undated) DEVICE — STEINMANN PIN
Type: IMPLANTABLE DEVICE | Site: ANKLE | Status: NON-FUNCTIONAL
Brand: INBONE
Removed: 2020-12-11

## (undated) DEVICE — SPLNT PLSTR ORTHO 5IN

## (undated) DEVICE — DRSNG GZ CURAD XEROFORM NONADHS 5X9IN STRL

## (undated) DEVICE — DRSNG GZ PETROLTM XEROFORM CURAD 1X8IN STRL

## (undated) DEVICE — PREP SOL POVIDONE/IODINE BT 4OZ

## (undated) DEVICE — TRAP FLD MINIVAC MEGADYNE 100ML

## (undated) DEVICE — GOWN,REINF,POLY,SIRUS,BREATH SLV,XLNG/XL: Brand: MEDLINE

## (undated) DEVICE — APPL CHLORAPREP HI/LITE 26ML ORNG

## (undated) DEVICE — INTENDED FOR TISSUE SEPARATION, AND OTHER PROCEDURES THAT REQUIRE A SHARP SURGICAL BLADE TO PUNCTURE OR CUT.: Brand: BARD-PARKER ® CARBON RIB-BACK BLADES

## (undated) DEVICE — TALAR PEG DRILL: Brand: INBONE

## (undated) DEVICE — SUT VIC 2/0 CT2 27IN J269H

## (undated) DEVICE — SPNG LAP 18X18IN LF STRL PK/5

## (undated) DEVICE — K-WIRE
Type: IMPLANTABLE DEVICE | Site: ANKLE | Status: NON-FUNCTIONAL
Brand: INBONE
Removed: 2020-12-11

## (undated) DEVICE — SPNG GZ WOVN 4X4IN 12PLY 10/BX STRL

## (undated) DEVICE — DRP C/ARMOR

## (undated) DEVICE — PROXIMATE RH ROTATING HEAD SKIN STAPLERS (35 WIDE) CONTAINS 35 STAINLESS STEEL STAPLES: Brand: PROXIMATE

## (undated) DEVICE — POOLE SUCTION HANDLE: Brand: CARDINAL HEALTH

## (undated) DEVICE — Device: Brand: PROPHECY INFINITY

## (undated) DEVICE — SUT ETHLN 3/0 PS1 18IN 1663H

## (undated) DEVICE — STCKNT IMPERV 12IN STRL

## (undated) DEVICE — GLV SURG PREMIERPRO ORTHO LTX PF SZ8 BRN

## (undated) DEVICE — COVER,TABLE,44X90,STERILE: Brand: MEDLINE

## (undated) DEVICE — PENCL E/S ULTRAVAC TELESCP NOSE HOLSTR 10FT

## (undated) DEVICE — PRECISION THIN (27.0 X 0.38MM)

## (undated) DEVICE — TIBIAL CORNER DRILL: Brand: INFINITY

## (undated) DEVICE — PK ORTHO MINOR TOWER 40

## (undated) DEVICE — ZIPPERED TOGA, 2X LARGE: Brand: FLYTE, SURGICOOL